# Patient Record
Sex: MALE | Race: WHITE | NOT HISPANIC OR LATINO | Employment: STUDENT | URBAN - NONMETROPOLITAN AREA
[De-identification: names, ages, dates, MRNs, and addresses within clinical notes are randomized per-mention and may not be internally consistent; named-entity substitution may affect disease eponyms.]

---
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
## 2021-05-24 ENCOUNTER — OFFICE VISIT (OUTPATIENT)
Dept: OCCUPATIONAL MEDICINE | Age: 21
End: 2021-05-24

## 2021-05-24 DIAGNOSIS — Z00.8 HEALTH EXAMINATION IN POPULATION SURVEY: Primary | ICD-10-CM

## 2021-05-24 PROCEDURE — OH123 RAPID TEST DRUG KIT & COLLECTION 5 PANEL: Performed by: FAMILY MEDICINE

## 2021-10-15 ENCOUNTER — HOSPITAL ENCOUNTER (EMERGENCY)
Facility: CLINIC | Age: 21
Discharge: HOME | End: 2021-10-15
Attending: EMERGENCY MEDICINE | Admitting: EMERGENCY MEDICINE
Payer: COMMERCIAL

## 2021-10-15 VITALS
HEART RATE: 74 BPM | BODY MASS INDEX: 25.34 KG/M2 | HEIGHT: 70 IN | SYSTOLIC BLOOD PRESSURE: 98 MMHG | DIASTOLIC BLOOD PRESSURE: 50 MMHG | WEIGHT: 177 LBS | RESPIRATION RATE: 16 BRPM | OXYGEN SATURATION: 96 %

## 2021-10-15 DIAGNOSIS — F12.10 MARIJUANA ABUSE: ICD-10-CM

## 2021-10-15 PROCEDURE — 250N000011 HC RX IP 250 OP 636: Performed by: EMERGENCY MEDICINE

## 2021-10-15 PROCEDURE — 99283 EMERGENCY DEPT VISIT LOW MDM: CPT | Performed by: EMERGENCY MEDICINE

## 2021-10-15 PROCEDURE — 250N000013 HC RX MED GY IP 250 OP 250 PS 637: Performed by: EMERGENCY MEDICINE

## 2021-10-15 PROCEDURE — 99284 EMERGENCY DEPT VISIT MOD MDM: CPT | Performed by: EMERGENCY MEDICINE

## 2021-10-15 RX ORDER — ONDANSETRON 4 MG/1
4 TABLET, ORALLY DISINTEGRATING ORAL ONCE
Status: COMPLETED | OUTPATIENT
Start: 2021-10-15 | End: 2021-10-15

## 2021-10-15 RX ORDER — ESCITALOPRAM OXALATE 20 MG/1
20 TABLET ORAL DAILY
COMMUNITY
End: 2023-01-30

## 2021-10-15 RX ORDER — OLANZAPINE 5 MG/1
5 TABLET, ORALLY DISINTEGRATING ORAL ONCE
Status: COMPLETED | OUTPATIENT
Start: 2021-10-15 | End: 2021-10-15

## 2021-10-15 RX ADMIN — OLANZAPINE 5 MG: 5 TABLET, ORALLY DISINTEGRATING ORAL at 02:42

## 2021-10-15 RX ADMIN — ONDANSETRON 4 MG: 4 TABLET, ORALLY DISINTEGRATING ORAL at 02:42

## 2021-10-15 ASSESSMENT — MIFFLIN-ST. JEOR: SCORE: 1819.12

## 2021-10-15 NOTE — ED PROVIDER NOTES
"ED Provider Note  [facility]      History   CC: Marijuana use    HPI  Barertt Bazan is a 21 year old male who has a past medical history of anxiety presenting with vomiting and anxiety after smoking marijuana.  He typically does not smoke marijuana.  He also had a few drinks prior to this.  Denies pain, chest pain, shortness of breath or abdominal pain.  No black or blood in his vomit.  He did not have much to drink he states.  Denies any other symptoms, fevers or other issues.  Patient said he would like something to stop his high.  Denies thoughts of harming self or others, no hallucinations or voices.    Past Medical History  Past Medical History:   Diagnosis Date     History of appendectomy      History reviewed. No pertinent surgical history.  escitalopram (LEXAPRO) 20 MG tablet      Allergies   Allergen Reactions     Amoxicillin-Pot Clavulanate GI Disturbance     Stomach cramping     Oxycodone Anaphylaxis and Other (See Comments)     Difficulty breathing  Respiratory depression  \"restricts breathing\"  \"restricts breathing\"       Doxycycline GI Disturbance     Stomach cramping     Family History  History reviewed. No pertinent family history.  Social History   Social History     Tobacco Use     Smoking status: Never Smoker     Smokeless tobacco: Never Used   Substance Use Topics     Alcohol use: Yes     Drug use: Yes     Types: Marijuana      Past medical history, past surgical history, medications, allergies, family history, and social history were reviewed with the patient. No additional pertinent items.       Review of Systems  A complete review of systems was performed with pertinent positives and negatives noted in the HPI, and all other systems negative.    Physical Exam   BP: 130/79  Pulse: 98  Resp: 16  Height: 177.8 cm (5' 10\")  Weight: 80.3 kg (177 lb)  SpO2: 98 %  Physical Exam  Physical Exam   Constitutional: oriented to person, place, and time. appears well-developed " and well-nourished.   HENT:   Head: Normocephalic and atraumatic.   Neck: Normal range of motion.   Pulmonary/Chest: Effort normal. No respiratory distress.   Cardiac: No murmurs, rubs, gallops. RRR.  Abdominal: Abdomen soft, nontender, nondistended. No rebound tenderness.  MSK: Long bones without deformity or evidence of trauma  Neurological: alert and oriented to person, place, and time.  Gait intact.  Moving all extremities.  Pupils 4 mm and reactive bilaterally to light.  No nystagmus.  No clonus.  No tremor.  Patellar reflexes 2+ and lower extremities.  Sensation intact in all extremities.   Skin: Skin is warm and dry.   Psychiatric:  normal mood and affect.  behavior is normal. Thought content normal.     ED Course      Procedures       No results found for any visits on 10/15/21.  Medications - No data to display     Assessments & Plan (with Medical Decision Making)   MDM  Patient is presenting with intoxication with marijuana.  He is given Zyprexa in addition to Zofran to help with symptoms.  Vitals are stable and the patient otherwise appears well and nontoxic.  No other signs or symptoms of toxidrome.  No acute psychosis at this point.    Re eval: Symptoms improved. Patient sleeping at this point.    Reeval: The patient is ambulating and tolerating p.o. intake.  Patient otherwise appears well.  He will be discharged, discussed marijuana cessation.    I have reviewed the nursing notes. I have reviewed the findings, diagnosis, plan and need for follow up with the patient.    New Prescriptions    No medications on file       Final diagnoses:   Marijuana abuse       --  Jacob Negro  [facility] EMERGENCY DEPARTMENT  10/15/2021     Jacob Negro MD  10/15/21 0623

## 2021-10-15 NOTE — DISCHARGE INSTRUCTIONS
Please make an appointment to follow up with Primary Care - [facility] (phone: [phone]) in 2-3 days if you are not improving    Return to the Emergency Department if you have any further concerns

## 2021-10-15 NOTE — ED TRIAGE NOTES
"Pt arrives to ED with c/o smoking too much marijuana. Pt is anxious, \"twitchy,\" and having bouts of emesis.   "

## 2022-02-15 ENCOUNTER — TRANSCRIBE ORDERS (OUTPATIENT)
Dept: OTHER | Age: 22
End: 2022-02-15
Payer: COMMERCIAL

## 2022-02-15 DIAGNOSIS — S43.431A LABRAL TEAR OF SHOULDER, RIGHT, INITIAL ENCOUNTER: Primary | ICD-10-CM

## 2022-02-15 DIAGNOSIS — M25.511 RIGHT SHOULDER PAIN, UNSPECIFIED CHRONICITY: ICD-10-CM

## 2022-02-24 ENCOUNTER — APPOINTMENT (OUTPATIENT)
Dept: GENERAL RADIOLOGY | Facility: CLINIC | Age: 22
End: 2022-02-24
Attending: EMERGENCY MEDICINE
Payer: COMMERCIAL

## 2022-02-24 ENCOUNTER — HOSPITAL ENCOUNTER (EMERGENCY)
Facility: CLINIC | Age: 22
Discharge: HOME | End: 2022-02-24
Attending: EMERGENCY MEDICINE | Admitting: EMERGENCY MEDICINE
Payer: COMMERCIAL

## 2022-02-24 VITALS
TEMPERATURE: 96.9 F | HEIGHT: 70 IN | BODY MASS INDEX: 25.05 KG/M2 | HEART RATE: 60 BPM | DIASTOLIC BLOOD PRESSURE: 59 MMHG | RESPIRATION RATE: 16 BRPM | WEIGHT: 175 LBS | OXYGEN SATURATION: 96 % | SYSTOLIC BLOOD PRESSURE: 133 MMHG

## 2022-02-24 DIAGNOSIS — R00.2 PALPITATIONS: Primary | ICD-10-CM

## 2022-02-24 LAB
ANION GAP SERPL CALCULATED.3IONS-SCNC: 9 MMOL/L (ref 3–14)
ATRIAL RATE - MUSE: 74 BPM
BASOPHILS # BLD AUTO: 0 10E3/UL (ref 0–0.2)
BASOPHILS NFR BLD AUTO: 1 %
BUN SERPL-MCNC: 21 MG/DL (ref 7–30)
CALCIUM SERPL-MCNC: 9.1 MG/DL (ref 8.5–10.1)
CHLORIDE BLD-SCNC: 107 MMOL/L (ref 94–109)
CO2 SERPL-SCNC: 26 MMOL/L (ref 20–32)
CREAT SERPL-MCNC: 0.99 MG/DL (ref 0.66–1.25)
DIASTOLIC BLOOD PRESSURE - MUSE: NORMAL MMHG
EOSINOPHIL # BLD AUTO: 0.1 10E3/UL (ref 0–0.7)
EOSINOPHIL NFR BLD AUTO: 2 %
ERYTHROCYTE [DISTWIDTH] IN BLOOD BY AUTOMATED COUNT: 13.6 % (ref 10–15)
GFR SERPL CREATININE-BSD FRML MDRD: >90 ML/MIN/1.73M2
GLUCOSE BLD-MCNC: 98 MG/DL (ref 70–99)
HCT VFR BLD AUTO: 46.6 % (ref 40–53)
HGB BLD-MCNC: 15.6 G/DL (ref 13.3–17.7)
HOLD SPECIMEN: NORMAL
IMM GRANULOCYTES # BLD: 0 10E3/UL
IMM GRANULOCYTES NFR BLD: 0 %
INTERPRETATION ECG - MUSE: NORMAL
LYMPHOCYTES # BLD AUTO: 3.2 10E3/UL (ref 0.8–5.3)
LYMPHOCYTES NFR BLD AUTO: 45 %
MCH RBC QN AUTO: 28.7 PG (ref 26.5–33)
MCHC RBC AUTO-ENTMCNC: 33.5 G/DL (ref 31.5–36.5)
MCV RBC AUTO: 86 FL (ref 78–100)
MONOCYTES # BLD AUTO: 0.6 10E3/UL (ref 0–1.3)
MONOCYTES NFR BLD AUTO: 9 %
NEUTROPHILS # BLD AUTO: 3.1 10E3/UL (ref 1.6–8.3)
NEUTROPHILS NFR BLD AUTO: 43 %
NRBC # BLD AUTO: 0 10E3/UL
NRBC BLD AUTO-RTO: 0 /100
P AXIS - MUSE: 63 DEGREES
PLATELET # BLD AUTO: 259 10E3/UL (ref 150–450)
POTASSIUM BLD-SCNC: 3.9 MMOL/L (ref 3.4–5.3)
PR INTERVAL - MUSE: 132 MS
QRS DURATION - MUSE: 100 MS
QT - MUSE: 406 MS
QTC - MUSE: 450 MS
R AXIS - MUSE: 74 DEGREES
RBC # BLD AUTO: 5.43 10E6/UL (ref 4.4–5.9)
SODIUM SERPL-SCNC: 142 MMOL/L (ref 133–144)
SYSTOLIC BLOOD PRESSURE - MUSE: NORMAL MMHG
T AXIS - MUSE: 48 DEGREES
TROPONIN I SERPL HS-MCNC: 5 NG/L
VENTRICULAR RATE- MUSE: 74 BPM
WBC # BLD AUTO: 7 10E3/UL (ref 4–11)

## 2022-02-24 PROCEDURE — 36415 COLL VENOUS BLD VENIPUNCTURE: CPT | Performed by: EMERGENCY MEDICINE

## 2022-02-24 PROCEDURE — 85025 COMPLETE CBC W/AUTO DIFF WBC: CPT | Performed by: EMERGENCY MEDICINE

## 2022-02-24 PROCEDURE — 84484 ASSAY OF TROPONIN QUANT: CPT | Performed by: EMERGENCY MEDICINE

## 2022-02-24 PROCEDURE — 99284 EMERGENCY DEPT VISIT MOD MDM: CPT | Mod: 25 | Performed by: EMERGENCY MEDICINE

## 2022-02-24 PROCEDURE — 71046 X-RAY EXAM CHEST 2 VIEWS: CPT | Mod: 26 | Performed by: RADIOLOGY

## 2022-02-24 PROCEDURE — 99284 EMERGENCY DEPT VISIT MOD MDM: CPT | Performed by: EMERGENCY MEDICINE

## 2022-02-24 PROCEDURE — 93005 ELECTROCARDIOGRAM TRACING: CPT | Performed by: EMERGENCY MEDICINE

## 2022-02-24 PROCEDURE — 71046 X-RAY EXAM CHEST 2 VIEWS: CPT

## 2022-02-24 PROCEDURE — 80048 BASIC METABOLIC PNL TOTAL CA: CPT | Performed by: EMERGENCY MEDICINE

## 2022-02-24 ASSESSMENT — ENCOUNTER SYMPTOMS
DIZZINESS: 0
BACK PAIN: 0
CHILLS: 0
NERVOUS/ANXIOUS: 1
DIARRHEA: 0
CHEST TIGHTNESS: 1
NAUSEA: 0
FEVER: 0
ABDOMINAL PAIN: 0
LIGHT-HEADEDNESS: 1
VOMITING: 0
DIFFICULTY URINATING: 0
SHORTNESS OF BREATH: 1
CONSTIPATION: 0

## 2022-02-24 NOTE — ED PROVIDER NOTES
"ED Provider Note  [facility]      History     Chief Complaint   Patient presents with     Palpitations     Anxiety     HPI  Barrett Bazan is a 21 year old male with a PMH of marijuana abuse, chlamydia, right labral shoulder tear and depression who presents to the ED today complaining of anxiety and palpitations.***    {Past History:770251}      Review of Systems  {Complete vs limited ROS:944947::\"A complete review of systems was performed with pertinent positives and negatives noted in the HPI, and all other systems negative.\"}    Physical Exam   BP: (!) 159/84  Pulse: 105  Temp: 96.9  F (36.1  C)  Resp: 20  Height: 177.8 cm (5' 10\")  Weight: 79.4 kg (175 lb)  SpO2: 99 %  Physical Exam  ***  ED Course      Procedures       {ED Course Selections:784750}              Results for orders placed or performed during the hospital encounter of 02/24/22   EKG 12-lead, tracing only     Status: None (Preliminary result)   Result Value Ref Range    Systolic Blood Pressure  mmHg    Diastolic Blood Pressure  mmHg    Ventricular Rate 74 BPM    Atrial Rate 74 BPM    VT Interval 132 ms    QRS Duration 100 ms     ms    QTc 450 ms    P Axis 63 degrees    R AXIS 74 degrees    T Axis 48 degrees    Interpretation ECG Sinus rhythm  Normal ECG        Medications - No data to display     Assessments & Plan (with Medical Decision Making)   ***    I have reviewed the nursing notes. I have reviewed the findings, diagnosis, plan and need for follow up with the patient.    New Prescriptions    No medications on file       Final diagnoses:   None       --  ***  [facility] EMERGENCY DEPARTMENT  2/24/2022  "

## 2022-02-24 NOTE — ED PROVIDER NOTES
ED Provider Note  [facility]      History     Chief Complaint   Patient presents with     Palpitations     Anxiety     HPI  Barrett Bazan is a 21 year old male with of who comes in with palpitations. About three hours ago (10:30pm), he was sitting reading and felt his heard beating hard in his chest. Over the next hour his heart felt as if it was fluctuating between beating hard, slowly, and then the palpations would briefly resolve. The episodes were associated with chest pain and tightness, shortness of breath, lightheadedness, and diaphoresis. Nothing made these symptoms better. Anxiety made it worse. No chest pain. No vertigo. No fever, chills, sore throat, changes in bowel, changes in bladder, or any other infectious symptoms. No numbness or tingling or focal neurological changes.     He does have increased stressed with an exam tomorrow. Due to this stress, he has been sleeping less (4hrs) and drinking more coffee. He takes Adderall daily. Drinks alcohol ~3 times per week and vapes nicotine.     Family history of high cholesterol and heart disease in mother. No family history of sudden death or heart attack at a young age.      Review of Systems   Constitutional: Negative for chills and fever.   HENT: Negative for sneezing.    Respiratory: Positive for chest tightness and shortness of breath.    Cardiovascular: Positive for chest pain.   Gastrointestinal: Negative for abdominal pain, constipation, diarrhea, nausea and vomiting.   Genitourinary: Negative for difficulty urinating.   Musculoskeletal: Negative for back pain.   Neurological: Positive for light-headedness. Negative for dizziness.   Psychiatric/Behavioral: The patient is nervous/anxious.      A complete review of systems was performed with pertinent positives and negatives noted in the HPI, and all other systems negative.    Physical Exam   BP: (!) 159/84  Pulse: 105  Temp: 96.9  F (36.1  C)  Resp: 20  Height: 177.8 cm  "(5' 10\")  Weight: 79.4 kg (175 lb)  SpO2: 99 %  Physical Exam  Vitals and nursing note reviewed.   Constitutional:       Appearance: Normal appearance.   HENT:      Head: Normocephalic and atraumatic.      Nose: Nose normal.      Mouth/Throat:      Mouth: Mucous membranes are moist.   Eyes:      Extraocular Movements: Extraocular movements intact.      Conjunctiva/sclera: Conjunctivae normal.      Pupils: Pupils are equal, round, and reactive to light.   Cardiovascular:      Rate and Rhythm: Normal rate and regular rhythm.   Pulmonary:      Effort: Pulmonary effort is normal.      Breath sounds: Normal breath sounds.   Skin:     General: Skin is warm.   Neurological:      Mental Status: He is alert and oriented to person, place, and time.       ED Course       1:00 AM Medical student saw and assessed the patient.   3:15 AM Dr. Donnelly saw and assessed the patient. Updated him on the results. The patient is agreeable with the plan for discharge.     Procedures            EKG Interpretation:      Interpreted by Soham Donnelly  Time reviewed: 0045  Symptoms at time of EKG: palpitations  Rhythm: normal sinus   Rate: 74  Axis: normal  Ectopy: none  Conduction: normal  ST Segments/ T Waves: No ST-T wave changes  Q Waves: none  Comparison to prior: No old EKG available    Clinical Impression: normal EKG              Results for orders placed or performed during the hospital encounter of 02/24/22   XR Chest 2 Views     Status: None    Narrative    EXAM: XR CHEST 2 VW  LOCATION: [facility]  DATE/TIME: 2/24/2022 2:27 AM    INDICATION: chest pain  COMPARISON: None.      Impression    IMPRESSION: Heart size is normal. Lungs are clear. No pneumothorax or pleural effusion.   Basic metabolic panel     Status: Normal   Result Value Ref Range    Sodium 142 133 - 144 mmol/L    Potassium 3.9 3.4 - 5.3 mmol/L    Chloride 107 94 - 109 mmol/L    Carbon Dioxide (CO2) 26 20 - 32 mmol/L    " Anion Gap 9 3 - 14 mmol/L    Urea Nitrogen 21 7 - 30 mg/dL    Creatinine 0.99 0.66 - 1.25 mg/dL    Calcium 9.1 8.5 - 10.1 mg/dL    Glucose 98 70 - 99 mg/dL    GFR Estimate >90 >60 mL/min/1.73m2   Troponin I     Status: Normal   Result Value Ref Range    Troponin I High Sensitivity 5 <79 ng/L   CBC with platelets and differential     Status: None   Result Value Ref Range    WBC Count 7.0 4.0 - 11.0 10e3/uL    RBC Count 5.43 4.40 - 5.90 10e6/uL    Hemoglobin 15.6 13.3 - 17.7 g/dL    Hematocrit 46.6 40.0 - 53.0 %    MCV 86 78 - 100 fL    MCH 28.7 26.5 - 33.0 pg    MCHC 33.5 31.5 - 36.5 g/dL    RDW 13.6 10.0 - 15.0 %    Platelet Count 259 150 - 450 10e3/uL    % Neutrophils 43 %    % Lymphocytes 45 %    % Monocytes 9 %    % Eosinophils 2 %    % Basophils 1 %    % Immature Granulocytes 0 %    NRBCs per 100 WBC 0 <1 /100    Absolute Neutrophils 3.1 1.6 - 8.3 10e3/uL    Absolute Lymphocytes 3.2 0.8 - 5.3 10e3/uL    Absolute Monocytes 0.6 0.0 - 1.3 10e3/uL    Absolute Eosinophils 0.1 0.0 - 0.7 10e3/uL    Absolute Basophils 0.0 0.0 - 0.2 10e3/uL    Absolute Immature Granulocytes 0.0 <=0.4 10e3/uL    Absolute NRBCs 0.0 10e3/uL   EKG 12-lead, tracing only     Status: None (Preliminary result)   Result Value Ref Range    Systolic Blood Pressure  mmHg    Diastolic Blood Pressure  mmHg    Ventricular Rate 74 BPM    Atrial Rate 74 BPM    ME Interval 132 ms    QRS Duration 100 ms     ms    QTc 450 ms    P Axis 63 degrees    R AXIS 74 degrees    T Axis 48 degrees    Interpretation ECG Sinus rhythm  Normal ECG      CBC with platelets differential     Status: None    Narrative    The following orders were created for panel order CBC with platelets differential.  Procedure                               Abnormality         Status                     ---------                               -----------         ------                     CBC with platelets and d...[[id number]]                      Final result                 Please  view results for these tests on the individual orders.     Medications - No data to display     Assessments & Plan (with Medical Decision Making)   Barrett Bazan is a 21 year old male with of who comes in with palpitations. Differential diagnosis includes: arrhythmias, valvular disease, acute coronary syndrome, psychiatric causes (I.e., panic attack, anxiety, somatic symptom disorder, drugs/medications (alcohol, caffeine, tobacco, cocaine, etc.), hyperthyroidism, and anemia.      Exam did not reveal any arrhythmia, murmurs, rubs, gallops. EKG was unremarkable. BMP returned in normal limits. Troponin was within normal limits. History revealed high levels of caffeine intake (3 cups of coffee & tobacco use), decreased sleep, and increased stress. Given his unremarkable work-up and risk factors of palpations, his palpations were likely provoked by a mixture of psychiatric and caffeine/stimulant use. However, discharging with plan for cardiac Holter monitor to further assess for arrhythmias.     I have reviewed the nursing notes. I have reviewed the findings, diagnosis, plan and need for follow up with the patient.      --    ED Attending Physician Attestation    I Soham Donnelly DO, cared for this patient with the Medical Student. I performed, or re-performed, the physical exam and medical decision-making. I have verified the accuracy of all the medical student findings and documentation above, and have edited as necessary.    Summary of HPI, PE, ED Course   Patient is a 21 year old male evaluated in the emergency department for palpitations. Exam notable for well appearing. ED course notable for negative cardiac w/u. After the completion of care in the emergency department, the patient was discharged.    Critical Care & Procedures  Not applicable.    Medical Decision Making  The medical record was reviewed and interpreted.  Current labs reviewed and interpreted.   Patient resents for evaluation of palpitations.  Has  multiple risk factors for palpitations including increased school stress, caffeine, tobacco, Adderall.  On arrival, patient is normal vital signs.  Overall appears well.  PERC negative, unlikely PE.  EKG without evidence of significant cardiac dysrhythmia.  No signs of acute ischemia.  Troponin negative x1.  No chest pain, no further ACS work-up indicated.  No electrolyte abnormality.  No anemia.    Given the negative work-up, pt discharged in good condition.  Patient will be referred for Zio Patch Holter monitor and recommend PCP follow-up in 1 week.      Soham Donnelly DO  Emergency Medicine       Discharge Medication List as of 2/24/2022  3:26 AM          Final diagnoses:   Palpitations     Tiera Martin, MS4  --  Soham Donnelly DO  [facility] EMERGENCY DEPARTMENT  2/24/2022     Soham Donnelly DO  02/24/22 0557

## 2022-02-24 NOTE — Clinical Note
Beni was seen and treated in our emergency department on 2/24/2022.  He may return to school on 02/28/2022.      If you have any questions or concerns, please don't hesitate to call.      Sweta Mcgregor RN

## 2022-02-24 NOTE — Clinical Note
Beni was seen and treated in our emergency department on 2/24/2022.  He may return to school on 02/25/2022.      If you have any questions or concerns, please don't hesitate to call.      Sweta Mcgregor RN

## 2022-02-24 NOTE — ED TRIAGE NOTES
Pt. Reports to ED w/ c/o anxiety and palpitations that are waking him frequently throughout the night and causing him to be unable to sleep.

## 2022-02-24 NOTE — DISCHARGE INSTRUCTIONS
Your work-up in the emergency department including chest x-ray, labs, EKG did not reveal any significant medical abnormalities.  Would like to have you follow-up for a Zio patch (Holter monitor) application.  Please call the number below to set up this appointment.  You will need to follow-up with your primary care physician to discuss the results.  Return to the ED with any new or worsening symptoms.    [phone]

## 2022-03-01 ENCOUNTER — HOSPITAL ENCOUNTER (OUTPATIENT)
Dept: CARDIOLOGY | Facility: CLINIC | Age: 22
Discharge: HOME | End: 2022-03-01
Attending: EMERGENCY MEDICINE | Admitting: EMERGENCY MEDICINE
Payer: COMMERCIAL

## 2022-03-01 DIAGNOSIS — R00.2 PALPITATIONS: ICD-10-CM

## 2022-03-01 PROCEDURE — 93244 EXT ECG>48HR<7D REV&INTERPJ: CPT | Performed by: INTERNAL MEDICINE

## 2022-03-01 PROCEDURE — 93242 EXT ECG>48HR<7D RECORDING: CPT

## 2022-03-25 ENCOUNTER — THERAPY VISIT (OUTPATIENT)
Dept: PHYSICAL THERAPY | Facility: CLINIC | Age: 22
End: 2022-03-25
Payer: COMMERCIAL

## 2022-03-25 DIAGNOSIS — M25.511 RIGHT SHOULDER PAIN: Primary | ICD-10-CM

## 2022-03-25 DIAGNOSIS — S43.439A LABRAL TEAR OF SHOULDER: ICD-10-CM

## 2022-03-25 PROCEDURE — 97110 THERAPEUTIC EXERCISES: CPT | Mod: GP

## 2022-03-25 PROCEDURE — 97161 PT EVAL LOW COMPLEX 20 MIN: CPT | Mod: GP

## 2022-03-25 NOTE — PROGRESS NOTES
"Physical Therapy Initial Examination/Evaluation  March 25, 2022    Barrett Bazan is a 21 year old male referred to physical therapy by Dr. Mendoza for treatment of R shoulder pain and labral tear with Precautions/Restrictions/MD instructions Eval and treat.     Therapist Impression:   Patient has complaints of R shoulder pain with certain movements very infrequently, has been able to do almost all UE lifts (been avoiding chest and overhead press). Patient was snowboarding at [place], sustained a fall and found to have labral tear (patient reports 80% torn). Patient has surgery planned May 18, 2022 back home in [place]. Patient found to have poor posture, painful / weaker shoulder MMT, . Patient given HEP with scapular retraction, seated posture correction with lumbar roll, ER and IR with red TB.     Subjective:  DOI/onset: 12/28/2022 DOS: Planned May 18, 2022  Acute Injury or Gradual Onset?: Acute injury onset  Mechanism of Injury: Fall Snowboarding, fell and shoulder went underneath him, felt a pop and thought his arm was dislocated  Related PMH: None  Imaging: MRI  Chief Complaint/Functional Limitations: R shoulder pain and see below in therapy evaluation codes   Pain: rest 2/10, activity 9/10 Location: anterior Frequency: Intermittent Described as: shooting, dull, achy Previous Treatment: Rest, Aleve and Ibuprofen Effect of prior treatment: good Alleviated by: Rest Progression of Symptoms: Gradually getting better. Time of day when pain is worse: Activity related  Sleeping: No issues/uninterrupted   Occupation: student  Job duties: prolonged sitting, keyboarding/computer use, lifting/carrying  Current HEP/exercise regimen: Lifting UE, running, stretch   Patient's goals are see chief complaints \"To work out kinks in my shoulder\"     Other pertinent PMH/Red Flags: Depression   Barriers at home/work: None as reported by patient  Pertinent Surgical History:   Medications: Anti-depressants and Adderral "   General health as reported by patient: good  Return to MD:  PRN     SHOULDER EXAMINATION  Diagnosis: R labral tear     R handed     CERVICAL SCREEN  AROM: WNL/symmetrical All motions    STATIC POSTURE  Forward head: mild   Rounded shoulders:moderate  Shoulder internally rotated: mild     DYNAMIC SCAPULAR TESTS  Dynamic Scapular Assessment: Poor eccentric control R    SHOULDER RANGE OF MOTION * = painful  AROM Flexion Abduction ER   Base Ext/IR Horr ADD   Left WNL WNL WNL WNL WNL   Right WNL WNL WNL WNL WNL*    Pain: None    SHOULDER STRENGTH * = painful  MMT Flexion Abduction ER IR   Left 5-/5 5/5 5-/5 5-/5   Right 5-/5 5/5 4+/5* 5-/5*     SPECIAL TESTS Left Right   Impingement Negative Negative  Apprehension Negative Negative    PALPATION  Left: No tenderness to palpation  Right: No tenderness to palpation    Assessment/Plan:  Patient is a 21 year old male with right side shoulder complaints.    Patient has the following significant findings with corresponding treatment plan.                Diagnosis 1:  R shoulder pain  Decreased strength - therapeutic exercise and therapeutic activities  Impaired muscle performance - neuro re-education  Decreased function - therapeutic activities  Impaired posture - neuro re-education    Therapy Evaluation Codes:   1) History comprised of:   Personal factors that impact the plan of care:      None.    Comorbidity factors that impact the plan of care are:      Depression.     Medications impacting care: Anti-depressant and Adderall.  2) Examination of Body Systems comprised of:   Body structures and functions that impact the plan of care:      Shoulder.   Activity limitations that impact the plan of care are:      Pushing, Reaching and Lying.  3) Clinical presentation characteristics are:   Stable/Uncomplicated.  4) Decision-Making    Low complexity using standardized patient assessment instrument and/or measureable assessment of functional outcome.  Cumulative Therapy  Evaluation is: Low complexity.    Previous and current functional limitations:  (See Goal Flow Sheet for this information)    Short term and Long term goals: (See Goal Flow Sheet for this information)     Communication ability:  Patient appears to be able to clearly communicate and understand verbal and written communication and follow directions correctly.  Treatment Explanation - The following has been discussed with the patient:   RX ordered/plan of care  Anticipated outcomes  Possible risks and side effects  This patient would benefit from PT intervention to resume normal activities.   Rehab potential is good.    Frequency:  1 X week, once daily  Duration:  for 2 weeks, tapering to 2x/month x 2 months  Discharge Plan:  Achieve all LTG.  Independent in home treatment program.  Reach maximal therapeutic benefit.    Please refer to the daily flowsheet for treatment today, total treatment time and time spent performing 1:1 timed codes.

## 2022-04-08 ENCOUNTER — OFFICE VISIT (OUTPATIENT)
Dept: CARDIOLOGY | Age: 22
End: 2022-04-08

## 2022-04-08 ENCOUNTER — TELEPHONE (OUTPATIENT)
Dept: CARDIOLOGY | Age: 22
End: 2022-04-08

## 2022-04-08 DIAGNOSIS — R00.2 INTERMITTENT PALPITATIONS: ICD-10-CM

## 2022-04-08 DIAGNOSIS — R00.2 INTERMITTENT PALPITATIONS: Primary | ICD-10-CM

## 2022-04-16 ENCOUNTER — NURSE TRIAGE (OUTPATIENT)
Dept: TELEHEALTH | Age: 22
End: 2022-04-16

## 2022-04-21 PROCEDURE — 93272 ECG/REVIEW INTERPRET ONLY: CPT | Performed by: INTERNAL MEDICINE

## 2022-04-29 DIAGNOSIS — Z01.812 ENCOUNTER FOR PREPROCEDURE SCREENING LABORATORY TESTING FOR COVID-19: Primary | ICD-10-CM

## 2022-04-29 DIAGNOSIS — Z11.52 ENCOUNTER FOR PREPROCEDURE SCREENING LABORATORY TESTING FOR COVID-19: Primary | ICD-10-CM

## 2022-04-29 DIAGNOSIS — I20.89 ANGINAL EQUIVALENT: ICD-10-CM

## 2022-04-29 DIAGNOSIS — I47.29 NSVT (NONSUSTAINED VENTRICULAR TACHYCARDIA) (CMD): ICD-10-CM

## 2022-05-12 ENCOUNTER — IMAGING SERVICES (OUTPATIENT)
Dept: CV DIAGNOSTICS | Age: 22
End: 2022-05-12
Attending: INTERNAL MEDICINE

## 2022-05-12 VITALS — SYSTOLIC BLOOD PRESSURE: 100 MMHG | HEART RATE: 43 BPM | DIASTOLIC BLOOD PRESSURE: 60 MMHG

## 2022-05-12 DIAGNOSIS — I20.89 ANGINAL EQUIVALENT: ICD-10-CM

## 2022-05-12 DIAGNOSIS — I47.29 NSVT (NONSUSTAINED VENTRICULAR TACHYCARDIA) (CMD): ICD-10-CM

## 2022-05-12 LAB
INTERVENTRICULAR SEPTUM IN END DIASTOLE (IVSD): 0.9 CM
LEFT INTERNAL DIMENSION IN SYSTOLE (LVSD): 3.58 CM
LEFT VENTRICULAR INTERNAL DIMENSION IN DIASTOLE (LVDD): 5.44 CM
LEFT VENTRICULAR POSTERIOR WALL IN END DIASTOLE (LVPW): 0.82 CM
LV EF: 65 %

## 2022-05-12 PROCEDURE — 93351 STRESS TTE COMPLETE: CPT | Performed by: INTERNAL MEDICINE

## 2022-05-15 PROCEDURE — U0005 INFEC AGEN DETEC AMPLI PROBE: HCPCS | Performed by: CLINICAL MEDICAL LABORATORY

## 2022-05-15 PROCEDURE — PSEU10635 2019 NOVEL CORONAVIRUS (SARS-COV-2): Performed by: CLINICAL MEDICAL LABORATORY

## 2022-05-15 PROCEDURE — U0003 INFECTIOUS AGENT DETECTION BY NUCLEIC ACID (DNA OR RNA); SEVERE ACUTE RESPIRATORY SYNDROME CORONAVIRUS 2 (SARS-COV-2) (CORONAVIRUS DISEASE [COVID-19]), AMPLIFIED PROBE TECHNIQUE, MAKING USE OF HIGH THROUGHPUT TECHNOLOGIES AS DESCRIBED BY CMS-2020-01-R: HCPCS | Performed by: CLINICAL MEDICAL LABORATORY

## 2022-05-16 ENCOUNTER — LAB REQUISITION (OUTPATIENT)
Dept: LAB | Age: 22
End: 2022-05-16

## 2022-05-16 DIAGNOSIS — Z11.59 ENCOUNTER FOR SCREENING FOR OTHER VIRAL DISEASES: ICD-10-CM

## 2022-05-16 LAB
SARS-COV-2 RNA RESP QL NAA+PROBE: NOT DETECTED
SERVICE CMNT-IMP: NORMAL
SERVICE CMNT-IMP: NORMAL

## 2022-06-29 ENCOUNTER — APPOINTMENT (OUTPATIENT)
Dept: CT IMAGING | Age: 22
End: 2022-06-29
Attending: EMERGENCY MEDICINE

## 2022-06-29 ENCOUNTER — HOSPITAL ENCOUNTER (EMERGENCY)
Age: 22
Discharge: HOME | End: 2022-06-29
Attending: EMERGENCY MEDICINE

## 2022-06-29 VITALS
HEART RATE: 83 BPM | TEMPERATURE: 97.8 F | RESPIRATION RATE: 26 BRPM | DIASTOLIC BLOOD PRESSURE: 68 MMHG | WEIGHT: 180 LBS | HEIGHT: 70 IN | OXYGEN SATURATION: 95 % | SYSTOLIC BLOOD PRESSURE: 122 MMHG | BODY MASS INDEX: 25.77 KG/M2

## 2022-06-29 DIAGNOSIS — F41.0 ANXIETY ATTACK: Primary | ICD-10-CM

## 2022-06-29 LAB
ANION GAP BLD CALC-SCNC: 20 MMOL/L (ref 7–19)
BUN BLD-MCNC: 20 MG/DL (ref 6–20)
CA-I BLD-SCNC: 1.2 MMOL/L (ref 1.15–1.29)
CHLORIDE BLD-SCNC: 105 MMOL/L (ref 97–110)
CO2 BLD-SCNC: 23 MMOL/L (ref 19–24)
CREAT SERPL-MCNC: 0.9 MG/DL (ref 0.67–1.17)
GFR SERPLBLD BASED ON 1.73 SQ M-ARVRAT: >90 ML/MIN
GLUCOSE BLD-MCNC: 110 MG/DL (ref 70–99)
HCT VFR BLD CALC: 48 % (ref 39–51)
HGB BLD CALC-MCNC: 16.3 G/DL (ref 13–17)
POTASSIUM BLD-SCNC: 3.7 MMOL/L (ref 3.4–5.1)
SODIUM BLD-SCNC: 144 MMOL/L (ref 135–145)

## 2022-06-29 PROCEDURE — 70450 CT HEAD/BRAIN W/O DYE: CPT

## 2022-06-29 PROCEDURE — 80047 BASIC METABLC PNL IONIZED CA: CPT

## 2022-06-29 PROCEDURE — G1004 CDSM NDSC: HCPCS | Performed by: RADIOLOGY

## 2022-06-29 PROCEDURE — 70450 CT HEAD/BRAIN W/O DYE: CPT | Performed by: RADIOLOGY

## 2022-06-29 PROCEDURE — 10002800 HB RX 250 W HCPCS: Performed by: EMERGENCY MEDICINE

## 2022-06-29 PROCEDURE — 36415 COLL VENOUS BLD VENIPUNCTURE: CPT

## 2022-06-29 PROCEDURE — 96375 TX/PRO/DX INJ NEW DRUG ADDON: CPT

## 2022-06-29 PROCEDURE — 99284 EMERGENCY DEPT VISIT MOD MDM: CPT | Performed by: EMERGENCY MEDICINE

## 2022-06-29 PROCEDURE — 99284 EMERGENCY DEPT VISIT MOD MDM: CPT

## 2022-06-29 PROCEDURE — G1004 CDSM NDSC: HCPCS

## 2022-06-29 PROCEDURE — 96374 THER/PROPH/DIAG INJ IV PUSH: CPT

## 2022-06-29 RX ORDER — LORAZEPAM 2 MG/ML
1 INJECTION INTRAMUSCULAR ONCE
Status: COMPLETED | OUTPATIENT
Start: 2022-06-29 | End: 2022-06-29

## 2022-06-29 RX ORDER — ONDANSETRON 2 MG/ML
4 INJECTION INTRAMUSCULAR; INTRAVENOUS ONCE
Status: COMPLETED | OUTPATIENT
Start: 2022-06-29 | End: 2022-06-29

## 2022-06-29 RX ADMIN — ONDANSETRON 4 MG: 2 INJECTION INTRAMUSCULAR; INTRAVENOUS at 21:16

## 2022-06-29 RX ADMIN — LORAZEPAM 1 MG: 2 INJECTION INTRAMUSCULAR; INTRAVENOUS at 21:16

## 2022-06-29 ASSESSMENT — ENCOUNTER SYMPTOMS
ADENOPATHY: 0
SHORTNESS OF BREATH: 0
CONFUSION: 0
CHILLS: 0
NERVOUS/ANXIOUS: 1
FEVER: 0
NAUSEA: 0
CONSTIPATION: 0
COLOR CHANGE: 0
COUGH: 0
ABDOMINAL PAIN: 0
SORE THROAT: 0
BRUISES/BLEEDS EASILY: 0
NUMBNESS: 0
WEAKNESS: 0
DIZZINESS: 0
DIARRHEA: 0
VOMITING: 0
EYE REDNESS: 0
BACK PAIN: 0
HEADACHES: 0
EYE PAIN: 0
LIGHT-HEADEDNESS: 0

## 2022-06-29 ASSESSMENT — PAIN SCALES - GENERAL: PAINLEVEL_OUTOF10: 0

## 2022-06-30 LAB
RAINBOW EXTRA TUBES HOLD SPECIMEN: NORMAL
RAINBOW EXTRA TUBES HOLD SPECIMEN: NORMAL

## 2022-09-06 ENCOUNTER — TELEPHONE (OUTPATIENT)
Dept: PSYCHIATRY | Facility: CLINIC | Age: 22
End: 2022-09-06

## 2022-09-06 NOTE — TELEPHONE ENCOUNTER
PSYCHIATRY CLINIC PHONE INTAKE     SERVICES REQUESTED / INTERESTED IN          Individual Psychotherapy  and psychiatrist    Presenting Problem and Brief History                              What would you like to be seen for? (brief description):  Pt was diagnosed with depression about 4 years ago, ADD was diagnosed last November, and anxiety and anxiety disorder was diagnosed on March of 2022. Pt is taking venlafaxine 115mg per day and lorzapam as needed. Pt is looking to address his frequent problem of being very relaxed and then he will be stricken with arm pain, and his anxiety starts. The anxiety increases as the symptoms persist. Pt has had a brain scan and heart test, including multiple EKGs and chest x-ray. Pt's appetite is decreased and has been having bad dreams (since last night, which he believes is from the medication he takes). Pt is a student at the [employer], and his PCP from [place] has been prescribing medication.     Have you received a mental health diagnosis? Yes   Which one (s): Depression, ADD, Anxiety, and anxiety disorder  Is there any history of developmental delay?  Pt is unsure but he doesn't believe so.   Are you currently seeing a mental health provider?  No            Who / month last seen:  NA  Do you have mental health records elsewhere?  No  Will you sign a release so we can obtain them?  No    Have you ever been hospitalized for psychiatric reasons?  Yes  Describe:  Pt was admitted in 2019 for sever depression.     Do you have current thoughts of self-harm?  No    Do you currently have thoughts of harming others?  No       Substance Use History     Do you have any history of alcohol / illicit drug use?  No  Describe:  NA  Have you ever received treatment for this?  No    Describe:  NA     Social History     Who is the patient's a guardian?  Yes    Name / number: Maddy Bazan and Barrett Bazan - Parents - [phone] (mom) and [phone] (dad)  Have you had an ACT team in last 12  months?  No  Describe: NA   OK to leave a detailed voicemail?  Yes    Would you be interested in learning more about research opportunities for which you or your child may qualify? We can connect you with a team member for more information.  No  If yes, send an inbasket message to Ebony Thurston    Medical/ Surgical History                                   Patient Active Problem List   Diagnosis     Labral tear of shoulder     Right shoulder pain          Medications             Current Outpatient Medications   Medication Sig Dispense Refill     escitalopram (LEXAPRO) 20 MG tablet Take 20 mg by mouth daily           DISPOSITION      9/6/22 Intake complete. Scheduled for Supportive therapy w/ Mac Ramirez on 9/13 at 4pm for 60 mins. Scheduled for AMITA w/  on 9/19 at 10am.     Celeste Carreon Sr. - Lead

## 2022-09-13 ENCOUNTER — VIRTUAL VISIT (OUTPATIENT)
Dept: PSYCHIATRY | Facility: CLINIC | Age: 22
End: 2022-09-13
Attending: PSYCHOLOGIST
Payer: COMMERCIAL

## 2022-09-13 DIAGNOSIS — F41.0 PANIC DISORDER WITHOUT AGORAPHOBIA: Primary | ICD-10-CM

## 2022-09-13 PROCEDURE — 90832 PSYTX W PT 30 MINUTES: CPT | Mod: 95

## 2022-09-18 NOTE — PROGRESS NOTES
"     [facility]ry [facility]    Psychotherapy Progress Note      Date: Sep 13, 2022    Patient name: Barrett Bazan     Patient MRN: [id number]    Provider: Smita Abbott MD    Procedure: Individual psychotherapy session  Referred by:   Dr. Naylor (Full Diagnositic Assessment)      Session length: 37 minutes (Video (start: 4:00 PM ,Video  stop: 4:37 PM ).    Video-Visit Details  2  Type of service:  Video Visit    Video Start Time (time video started): 4pm    Video End Time (time video stopped): 4:37pm    Originating Location (pt. Location): Home    Distant Location (provider location):  Off-site    Mode of Communication:  Video Conference via [facility]    Physician has received verbal consent for a Video Visit from the patient? Yes            Session #1 :      Session content:  Writer met with patient over virtual video visit. Patient was introduced the concept of the supportive psychotherapy. The expectations were reviewed. His previous experience with therapy was reviewed and therapist identified the positive and negative aspects of previous encounters. It was mentioned that for each session there would be a progress note documented with the least amount of personal information necessary. Barrett expressed his willingness to stay on his current medication. Common TIPP skills were explained briefly. The patient indicated understanding and was given the opportunity to ask questions.      Behavioral assignment:  N/A     Behavioral observations/mental status:     Patient arrived on time to session. Patient was appropriately groomed and dressed. Eye contact was fair. Mood today was \"ok\". Observed affect was euthymic and appropriate which was consistent throughout the session. Speech was normal in tone, rate and volume. Thought process was linear, logical, goal oriented. Patient was actively engaged in session.    MENTAL STATUS EXAM  Appearance: appropriately dressed " "and groomed  Attitude: cooperative, grateful and pleasant  Behavior: normal  Eye Contact: normal  Speech: normal  Orientation: oreinted to person , place, time and situation  Mood: \"Ok\"  Affect: Euthymic, mood Congruent, reactive, appropriate to context, full and broad  Thought Process: clear, linear, goal-oriented  Suicidal Ideation: no thoughts/intention/plan  Hallucination: no       Risk assessment: Patient reported an absence of suicidal ideation. Based on risk and protective factors, patient is assessed to be at a low level of risk for suicide.     Risk factors: male, single, anxiety and panic attack  Protective factors: No SI, stable house, seek mental health care, no prior SI or suicide attempt, no access to lethal weapons        Diagnoses treated:  Encounter Diagnosis   Name Primary?     Panic disorder without agoraphobia Yes    blows in from plan \"visit diagnosis\"     Plan: Continue weekly individual supportive psychotherapy for the above diagnoses.       Aguilar Mccracken, PGY-2 (Psychiatry)  [facility]    "

## 2022-09-19 ENCOUNTER — VIRTUAL VISIT (OUTPATIENT)
Dept: PSYCHIATRY | Facility: CLINIC | Age: 22
End: 2022-09-19
Attending: PSYCHIATRY & NEUROLOGY
Payer: COMMERCIAL

## 2022-09-19 DIAGNOSIS — F41.1 GAD (GENERALIZED ANXIETY DISORDER): Primary | ICD-10-CM

## 2022-09-19 PROCEDURE — 90792 PSYCH DIAG EVAL W/MED SRVCS: CPT | Mod: 95 | Performed by: PSYCHIATRY & NEUROLOGY

## 2022-09-19 NOTE — PATIENT INSTRUCTIONS
**For crisis resources, please see the information at the end of this document**   Patient Education    Thank you for coming to the [facility] [place] [facility].     Lab Testing:  If you had lab testing today and your results are reassuring or normal they will be mailed to you or sent through [employer] within 7 days. If the lab tests need quick action we will call you with the results. The phone number we will call with results is # [phone]. If this is not the best number please call our clinic and change the number.     Medication Refills:  If you need any refills please call your pharmacy and they will contact us. Our fax number for refills is [phone].   Three business days of notice are needed for general medication refill requests.   Five business days of notice are needed for controlled substance refill requests.   If you need to change to a different pharmacy, please contact the new pharmacy directly. The new pharmacy will help you get your medications transferred.     Contact Us:  Please call [phone] during business hours (8-5:00 M-F).   If you have medication related questions after clinic hours, or on the weekend, please call [phone].     Financial Assistance [phone]   Medical Records [phone]       MENTAL HEALTH CRISIS RESOURCES:  For a emergency help, please call 911 or go to the nearest Emergency Department.     Emergency Walk-In Options:   Em[facility] Unit @ [facility] ([place]): [phone] - Specialized mental health emergency area designed to be calming  [facility] ([place]): [phone]  [facility] ([place]): [phone]  [facility]): [phone]    [facility] Crisis Information:   [place]: [phone]  [place]: [phone]  [place] - Adult: [phone]     Child: [phone]  [place] - Adult: [phone]     Child: [phone]  [place]:  [phone]  List of all [facility] resources:   https://[place].gov/dhs/people-we-serve/adults/health-care/mental-health/resources/crisis-contacts.jsp    National Crisis Information:   Crisis Text Line: Text  [place]  to 115654  Suicide & Crisis Lifeline: 988  National Suicide Prevention Lifeline: 4-915-267-TALK (1-146.106.3039)       For online chat options, visit https://suicidepreventionlifeline.org/chat/  Poison Control Center: 1-159-344-4840  Trans Lifeline: 7-201-203-7742 - Hotline for transgender people of all ages  The Minh Project: 5-545-753-2477 - Hotline for LGBT youth     For Non-Emergency Support:   Fast Tracker: Mental Health & Substance Use Disorder Resources -   https://www.[place].org/

## 2022-09-19 NOTE — PROGRESS NOTES
"Barrett Bazan is a 22 year old who has consented to receive services via billable video visit.      Pt will join video visit via: Venga  If there are problems joining the visit, send backup video invite via: Send to preferred e-mail: [email or web address]      Originating Location (patient location): Patient's home  Distant Location (provider location): [facility] [place] [facility]    Will anyone else be joining the video visit? No     PSYCHIATRY [facility] CONSULT NOTE            Telehealth Details  Type of service:  video visit for medication management  Date/Time of service: 9/19/2022    Start Time:  10:02 AM    End Time:   11.40 AM    Originating Site (patient location):  [facility] [place]   Location- Patient's home  Distant Site (provider location):  Provider office  Mode of Communication:  Video conference via Venga    CHIEF COMPLAINT                                                    \"Problems with panic attacks for the past 9 months.\"    HISTORY                                                    Barrett Bazan is a 22 year old male with a hx of ADHD ad Anxiety who  is self referred to the [facility] for a medication management consult. Pt is seen alone.    Most recent history started about 9 months ago when patient started having problems with panic attacks, in the context of an injury to his right labrum. He states that he would have sporadic episodes of arm pain, palpitations, heart rate when trying to go to bed. He states that this would make him \"shoot up\" from bed and this would make him even more anxious about sleeping. He states that starting form that time, he underwent a slew of tests, - EKG, stress tests, heart monitor, to r/o medical causes. He notes being relieved that \"nothing was positive\", and started seeing a therapist, whom he found unhelpful in the long run. He states that around summer, he explored meds with his PCP. He had been on escitalopram, for the past 4 " "years and cross-tapered to sertraline ( which was also unhelpful) before starting venlafaxine. He states that he had his last dose adjustment towards the end of August, now on 187.5 mg. He states that at the time he made this appointment, he was still struggling with managing his anxiety, but thinks now that the \" medication is finally settling\" and he is feeling a bit better.    Patient notes that currently, his anxiety and panic attacks are triggered by Issues with going to class, abrupt changes and relocating, as well as transitions. He states that he has noticed hat he is very sensitive to changes to his environment - intense artificial light in stores, car rides etc, make him feel light headed and nauseous. He denies worrying excesively but thinks that \"my body tends to react to anxiety even when I am not anxious\". Patient states that his last panic attack was last Monday at his first frat meeting, - his heart rate increased, started feeling clammy and light headed \" like I would pass out\", he notes having to step out and return to his dorm to take lorazepam 0.5 mg which is prescribed to him for his panic attacks and call his mom for reassurance, which is usually helpful. Patient states that he had been on hydroxyzine in the past as well as Clonazepam as a PRN for anxiety. He states that he tries to space out the lorazepam as he is aware of the addictive potential, he has a PCP in [place] who is monitoring this.     Pt notes that although he has always had an anxious temperament,and some social anxiety earlier, he has actually struggled more with depression in the past. He notes a hx of hospitalization for active SI in high school. He states that he had always struggled with attention/focus in school and thought he was \"just bad at school\", got tested last fall and was diagnosed with ADHD. Patient states he is prescribed Vyvnase 20 mg which was helpful, however he stopped it in February as it was potentially " "\"accelerating\" his anxiety attacks. However, this makes it challenging at school, as he is unable to focus appropriately or retain >20% of the information presented. He states that he has been utilizing behavioral interventions to improve his attention.  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    Patient states that his mood is good, no problems with sleep, some decreased appetite with venlafaxine. Patient endorses social use of alcohol as a frat member, denies current nicotine or other substance use. Pt notes that currently, he is interested in therapy to learn some coping skills, met with one last week but is unsure if supportive vs CBT. No safety concerns      Social Updates (home/ school/ substance use):  Family relationships: good     School:   Year: jared at the [employer], study, Global studies  IEP/504/Special Education: None  Suspensions/Expulsions: None  Grades: fair  School functioning: good    RECENT SYMPTOMS:   DEPRESSION:  reports-appetite changes, poor concentration /memory and overwhelmed;  DENIES- suicidal ideation, anhedonia, insomnia and excessive guilt  DYSREGULATION:  reports-none;  DENIES- suicidal ideation, violent ideation, SIB, impulsive and aggressive  PANIC ATTACK:  peaks in < 10 mins, occurs 1-2xx per week, triggers are mostly known, dizziness, flushing, palpitations, diaphoresis, extremity paresthesia, fear of impending doom  and fear of losing control or going crazy   ANXIETY:  panic attacks [see above], feeling fearful, obsessions [negative thoughts regarding to having a seizure or LOC 2/2 panic attacks] and nervous/overwhelmed  COMPULSIVE:  obsessive negative thoughts  ATTENTION:  difficulty paying attention, being easily distracted, sense of restlessness and h/o ADHD [314.00 " Predominantly inattentive ]  SLEEP:  none    EATING DISORDER: none    RECENT SUBSTANCE USE:     ALCOHOL- regular social drinker           TOBACCO- none          CAFFEINE- 1-2 coffees /day        OPIOIDS- none           NARCAN KIT- N/A    CANNABIS- none         OTHER ILLICIT DRUGS- none     CURRENT SOCIAL HISTORY:  Financial Support- family or friend.     Siblings  18 y/o sister.     Living Situation- in the frat dorm at the .      Social/Spiritual Support- family.     Feels Safe at Home- Yes.    MEDICAL ROS:  Reports A comprehensive review of systems was performed and is negative other than noted in the HPI..  Denies sedation, fatigue, headache, diaphoresis, dizziness.    SUBSTANCE USE HISTORY                                                                             Past Use- intermittent social use of alcohol, past use of nicotine  Treatment [#, most recent] - none  Medical Consequences [withdrawal, sz etc] - none  HIV/Hepatitis- none  Legal Consequences- none    PSYCHIATRIC HISTORY     SIB [method, most recent]- none  Suicidal Ideation Hx [passive, active]- hx of active SI 4 years ago with plan to shoot self  Suicide Attempt [#, recent, method]:   #- N/A   Most Recent- N/A    Violence/Aggression Hx- none  Psychosis Hx- none  Psych Hosp [ #, most recent, committed]- yes, one week hospitalization in 2019 in [place]  ECT [#, most recent]- none    Eating Disorder- none    Outpatient Programs [ DBT, Day Treatment, Eating Disorder Tx etc] : None    SOCIAL and FAMILY HISTORY                                          patient reported     Trauma History (self-report)- none  Legal- none  Social/Spiritual Support- none  Early History/Education-  Pt grew up in [place] and moved to [place] for college at the . He is close to parents, especially mom.  Family Mental Health History-  Mom has OCD, maternal family hx of Anxiety, Depression, Mood disorders and CD    PAST PSYCH MED TRIALS     Lexapro  Hydroxyzine   Zoloft  Clonazepam    MEDICAL  / SURGICAL HISTORY                                   CARE TEAM:          PCP- Dr Lewis in [place]                 Therapist- none    Patient Active Problem List   Diagnosis     Labral tear of shoulder     Right shoulder pain       ALLERGY                                Amoxicillin-pot clavulanate, Oxycodone, and Doxycycline  MEDICATIONS                               Current Outpatient Medications   Medication Sig Dispense Refill     escitalopram (LEXAPRO) 20 MG tablet Take 20 mg by mouth daily         VITALS   There were no vitals taken for this visit.   MENTAL STATUS EXAM                                                             Alertness: alert  and oriented  Appearance: casually groomed  Behavior/Demeanor: cooperative and pleasant, with good  eye contact   Speech: normal and regular rate and rhythm  Language: intact and no problems  Psychomotor: fidgety  Mood: neutral, but anxious when discussing certain topics  Affect: restricted, labile and appropriate; was congruent to mood; was congruent to content  Thought Process/Associations: unremarkable  Thought Content:  Reports preoccupations, over-valued ideas and this is related to anxiety related topics;  Denies suicidal and violent ideation and delusions  Perception:  Reports none;  Denies auditory hallucinations and visual hallucinations  Insight: fair  Judgment: good  Cognition: does  appear grossly intact; formal cognitive testing was not done    LABS and DATA       PHQ9 TODAY = N/A  No flowsheet data found.      PSYCHIATRIC DIAGNOSES                                                                                                   ADHD,   GAVIN with panic attacks  Unspecified depression, by hx    ASSESSMENT                                   Barrett Bazan is a 22 year old male with a hx of ADHD ad Anxiety who  is self referred to the [facility] for a medication management consult. Genetic loading for MH is significant for anxiety, mood disorders and CD. There are  medical contributors to presentation as patient had a right glenoid labrum injury prior to evolution of symptoms, with chronic shoulder pain and surgery in May, and is still recovering. He has had significant medical work-up to r/o organic causes and these were all negative. Critical item hx is significant for one prior hospitalization in 2019 for depression with SI. Substance use plays a role in his presentation.  Psychosocial stressors include - academics, transitioms chronic MH symptoms. Patient is help seeking, bright highly motivated and insightful and future-oriented.     Today, Patient does meet criteria for Anxiety with panic attacks. ADHD diagnosis was not explored specifically although he does appear to struggle more recently with chronic poor attention/focus, distractibility in the absence of his stimulants. Encourage a discussion with his PCP to determine if a re-trial at most recent dose ( or lower if not tolerated ) could be indicated since patient's anxiety is better controlled.     In terms of anxiety, these appear to be better controlled with recent clinical response to last medication dose adjustment. Pt is interested in therapy and would be a good candidate for CBT, to learn effective coping skills to manage his anxiety and negative ruminations. Provide psychoeducation on potential risks of long term use of lorazepam as a PRN, and potential benefits of other anxiolytics like hydroxyzine and behavioral interventions such as TIPP skills for management of anxiety.  Patient recently initiated care with therapist regarding but is unclear if planned modality of treatment is support vs CBT ( which is my recommendation for appropriate management of his anxiety). Encourage a discussion to address this need. Discuss plan for pt to monitor these panic attacks, paying more attention to triggers, and try to curb these symptoms before they escalate using aforementioned coping skills. Counseled on substance use  and need for sobriety for meds to be more effective. No safety concerns today.                               PLAN                                                                                                       1) MEDICATION:      - Continue meds per PCP    2) THERAPY:  Start    3) LABS NEXT DUE:  none       RATING SCALES:     N/A    4) REFERRALS [CD, medical, other]:  none    5) :  none    6) RTC: N/A, consult only. Pt has PCP for med mgt.    7) CRISIS NUMBERS: Provided in AVS today  Crisis Text Line for any crisis 24/7 send this-   To: [id number]   [facility]  [phone]      TREATMENT RISK STATEMENT:  The risks, benefits, alternatives and potential adverse effects have been discussed and are understood by the patient/ patient's guardian. The pt understands the risks of using street drugs or alcohol.  There are no medical contraindications, the pt agrees to treatment with the ability to do so.  The patient understands to call 911 or come to the nearest ED if life threatening or urgent symptoms present.        PROVIDER  Harper Naylor MD, MPH

## 2022-09-20 ENCOUNTER — VIRTUAL VISIT (OUTPATIENT)
Dept: PSYCHIATRY | Facility: CLINIC | Age: 22
End: 2022-09-20
Attending: PSYCHOLOGIST
Payer: COMMERCIAL

## 2022-09-20 DIAGNOSIS — F41.0 PANIC DISORDER WITHOUT AGORAPHOBIA: Primary | ICD-10-CM

## 2022-09-20 PROCEDURE — 90837 PSYTX W PT 60 MINUTES: CPT | Mod: 95

## 2022-09-22 NOTE — PROGRESS NOTES
"     [facility] Psychiatry Clinic    Psychotherapy Progress Note      Date: Sep 20, 2022    Patient name: Barrett Bazan     Patient MRN: [id number]    Provider: Smita Abbott MD    Procedure: Individual psychotherapy session    Session length: 53 minutes (start:1600 , stop:1653 ).    Video-Visit Details    Type of service:  Video Visit    Video Start Time (time video started) 1600    Video End Time (time video stopped): 1653    Originating Location (pt. Location): Home    Distant Location (provider location):  Off-site    Mode of Communication:  Video Conference via [employer]    Physician has received verbal consent for a Video Visit from the patient? Yes      Rohini Ramírez, PhD LP      Session #2      Session content: A genogram of client's family was developed. He talked about his background and his upbringing. The family dynamics were discussed. Identified difficulties during school and how he coped. At the end of the session TIP skills were reviewed using a handout.    Behavioral assignment:  N/A     Behavioral observations/mental status:   Patient arrived on time to session. Patient was appropriately groomed and dressed. Eye contact was good. Mood today was \"without any issues\". Observed affect was bright, full and reactive and was consistent throughout the session. Speech was normal in tone, rate and volume. Thought process was linear, logical, goal oriented. Patient was actively engaged in session.    MENTAL STATUS EXAM  Appearance: appropriate  Attitude: cooperative and engaged  Behavior: pleasant  Eye Contact: normal  Speech: normal  Orientation: oreinted to person , place, time and situation  Mood:  \"no issues\"  Affect: Mood Congruent, euthymic, reactive  Thought Process: clear, logical and organized  Suicidal Ideation: reports thoughts, no intention  Hallucination: no       Risk assessment: Patient reported an absence  of suicidal ideation in today's " "session.. Based on risk and protective factors, patient is assessed to be at a low level of risk for suicide.     Risk factors: Gender, race, single, depression  Protective factors: absence of SI, no prior SI, stable housing, social support, no access to lethal means       Diagnoses treated:  Encounter Diagnosis   Name Primary?     Panic disorder without agoraphobia Yes    blows in from plan \"visit diagnosis\"     Plan: Continue weekly individual supportive psychotherapy for the above diagnoses.    Aguilar Mccracken, PGY-2 (Psychiatry)  [facility]      "

## 2022-10-03 ENCOUNTER — HEALTH MAINTENANCE LETTER (OUTPATIENT)
Age: 22
End: 2022-10-03

## 2022-10-04 ENCOUNTER — VIRTUAL VISIT (OUTPATIENT)
Dept: PSYCHIATRY | Facility: CLINIC | Age: 22
End: 2022-10-04
Attending: PSYCHOLOGIST
Payer: COMMERCIAL

## 2022-10-04 DIAGNOSIS — F41.0 PANIC DISORDER WITHOUT AGORAPHOBIA: Primary | ICD-10-CM

## 2022-10-04 PROCEDURE — 90837 PSYTX W PT 60 MINUTES: CPT | Mod: 95

## 2022-10-05 NOTE — PROGRESS NOTES
"     [facility] Psychiatry Clinic    Psychotherapy Progress Note      Date: Oct 4, 2022    Patient name: Barrett Bazan     Patient MRN: [id number]    Provider: Smita Abbott MD    Procedure: Individual psychotherapy session    Session length: 53 minutes (start:1603 , stop:1656 ).  Video-Visit Details    Type of service:  Video Visit    Video Start Time (time video started): 16:03    Video End Time (time video stopped): 16:56    Originating Location (pt. Location): Home        Distant Location (provider location):  On-site    Mode of Communication:  Video Conference via [employer]    Physician has received verbal consent for a Video Visit from the patient? Yes            Session #3      Session content: Barrett caught the writer up with the events over the past 2 weeks. He shared a recent anxiety attack and the steps he took to ground himself. He was able to use cold ice pack technic which sounds to be effective. The current stressors regarding his goal to change major was reviewed. He expressed his interest in starting CBT sometime soon and appropriate instructions were provided.    Behavioral assignment:  None     Behavioral observations/mental status:     Patient arrived on time to session. Patient was appropriately groomed and dressed. Eye contact was appropriate. Mood today was \"fine\". Observed affect was euthymic and reactive throughout the session. Speech was normal in tone, rate and volume. Thought process was linear, logical, goal oriented. Patient was actively engaged in session.    MENTAL STATUS EXAM  Appearance: appropriate  Attitude: cooperative and engaged  Behavior: normal  Eye Contact: good  Speech: normal  Orientation: oreinted to person , place, time and situation  Mood:  \"fine\"  Affect: Mood Congruent, euthymic and reactive  Thought Process: logical and organized  Suicidal Ideation: denies thoughts/intention/plan  Hallucination: no       Risk " assessment:  Based on risk and protective factors, patient is assessed to be at a low level of risk for suicide.     Risk factors: Race, gender, anxiety disorder  Protective factors: No SI, no prior SI or attempt, no current mood symptoms, help seeking, lack of access to lethal means        Diagnoses treated:  Encounter Diagnosis   Name Primary?     Panic disorder without agoraphobia Yes         Plan: Continue weekly individual supportive psychotherapy for the above diagnoses.       Aguilar Mccracken, PGY-2 (Psychiatry)  [facility]

## 2022-10-11 ENCOUNTER — VIRTUAL VISIT (OUTPATIENT)
Dept: PSYCHIATRY | Facility: CLINIC | Age: 22
End: 2022-10-11
Attending: PSYCHOLOGIST
Payer: COMMERCIAL

## 2022-10-11 DIAGNOSIS — F41.0 PANIC DISORDER WITHOUT AGORAPHOBIA: Primary | ICD-10-CM

## 2022-10-11 PROCEDURE — 90834 PSYTX W PT 45 MINUTES: CPT | Mod: 95

## 2022-10-18 ENCOUNTER — VIRTUAL VISIT (OUTPATIENT)
Dept: PSYCHIATRY | Facility: CLINIC | Age: 22
End: 2022-10-18
Attending: PSYCHOLOGIST
Payer: COMMERCIAL

## 2022-10-18 DIAGNOSIS — F41.0 PANIC DISORDER WITHOUT AGORAPHOBIA: Primary | ICD-10-CM

## 2022-10-18 PROCEDURE — 90832 PSYTX W PT 30 MINUTES: CPT | Mod: 95

## 2022-10-18 NOTE — PROGRESS NOTES
"     [facility]    Psychotherapy Progress Note      Date: Oct 18, 2022    Patient name: Barrett Bazan     Patient MRN: [id number]    Provider: Smita Abbott MD    Procedure: Individual psychotherapy session    Session length: 25 minutes ([id number]: , stop:1630 ).  Video-Visit Details    Type of service:  Video Visit    Video Start Time (time video started): 1605    Video End Time (time video stopped):1630    Originating Location (pt. Location): Home    Distant Location (provider location):  On-site    Mode of Communication:  Video Conference via Voltea    Physician has received verbal consent for a Video Visit from the patient? Yes      Aguilar Mccracken MD      Session #5      Session content: Client talked about his recent viral illness and the fact that he's had to take OTC medication to help with symptoms. These has caused him some anxiety and insomnia at night. He reviewed the effective methods he utilized to go around it. Validation and encouragement as well as positive reinforcement was used in today's session.    Behavioral assignment:  N/A     Behavioral observations/mental status:   Patient arrived on time to session. Patient was appropriately groomed and dressed. Eye contact was appropriate. Mood today was \"OK\". Observed affect was euthymic and reactive and was consistent throughout the session. Speech was normal in tone, rate and volume. Thought process was linear, logical, goal oriented. Patient was actively engaged in session.    MENTAL STATUS EXAM  Appearance: dressed in casual fraternity T-shirt, well groomed  Attitude: engaged and cooperative  Eye Contact: normal  Speech: spontaneous, normal rate and rhythm and volume  Orientation: oreinted to person , place, time and situation  Mood:  admits to mild anxiety but is overall doing \"OK\"  Affect: Euthymic and reactive, full and broad, mood Congruent  Thought Process: logical and " "organized  Suicidal Ideation: reports thoughts, no intention  Hallucination: no       Risk assessment: Patient reported an absence  of suicidal ideation in today's session via the PHQ-9. Based on risk and protective factors, patient is assessed to be at a low level of risk for suicide.     Risk factors: Gender, Race, anxiety  Protective factors: no SI, No prior attempts, strong desire to live, future-oriented, strong social support from family, lives in a community, no substance use       Diagnoses treated:  Encounter Diagnosis   Name Primary?     Panic disorder without agoraphobia Yes    blows in from plan \"visit diagnosis\"     Plan: Continue weekly individual supportive psychotherapy for the above diagnoses.     Aguilar Mccracken, PGY-2 (Psychiatry)  [facility]    "

## 2022-10-19 NOTE — PROGRESS NOTES
"     [facility]    Psychotherapy Progress Note      Date: Oct 11, 2022    Patient name: Barrett Bazan     Patient MRN: [id number]    Provider: Smita Abbott MD    Procedure: Individual psychotherapy session    Session length: minutes (start:1602 , stop:1654 ).    Video-Visit Details    Type of service:  Video Visit    Video Start Time (time video started):16:02    Video End Time (time video stopped): 1654    Originating Location (pt. Location): Home    Distant Location (provider location):  On-site    Mode of Communication:  Video Conference via FlyBridGe    Physician has received verbal consent for a Video Visit from the patient? Yes          Session #4 w      Session content: Barrett shared his concerns about future specifically work place. Discussed the source of fear and what may have been feeding it. Prior work place experience was reviewed. Validation, positive reinforcement and emphasis on strengths was used by writer.    Behavioral assignment:  N/A     Behavioral observations/mental status:   Patient arrived on time to session. Patient was appropriately groomed and dressed. Eye contact was adequate. Mood today was \"pretty good\". Observed affect was euthymic and reactive and was consistent throughout the session. Speech was normal in tone, rate and volume. Thought process was linear, logical, goal oriented. Patient was actively engaged in session.    MENTAL STATUS EXAM  Appearance: Casual, appropriately groomed  Attitude: engaged  Eye Contact: normal  Speech: spontaneous, normal rate and rhythm and volume  Orientation: oreinted to person , place, time and situation  Mood: \"pretty good\"  Affect: Euthymic and reactive, full and broad, mood Congruent  Thought Process: logical and linear   Suicidal Ideation: reports thoughts, no intention  Hallucination: no       Risk assessment: Patient reported an absence  of suicidal ideation in today's session " "via the PHQ-9. Based on risk and protective factors, patient is assessed to be at a low level of risk for suicide.     Risk factors: Gender, Race, anxiety  Protective factors: no SI, No prior attempts, strong desire to live, future-oriented, strong social support from family, lives in a community, no substance use          Diagnoses treated:  Encounter Diagnosis   Name Primary?     Panic disorder without agoraphobia Yes    blows in from plan \"visit diagnosis\"     Plan: Continue weekly  individual supportive psychotherapy for the above diagnoses.     Aguilar Mccracken, PGY-2 (Psychiatry)  [facility]  "

## 2022-10-25 ENCOUNTER — VIRTUAL VISIT (OUTPATIENT)
Dept: PSYCHIATRY | Facility: CLINIC | Age: 22
End: 2022-10-25
Attending: PSYCHOLOGIST
Payer: COMMERCIAL

## 2022-10-25 DIAGNOSIS — F41.0 PANIC DISORDER WITHOUT AGORAPHOBIA: Primary | ICD-10-CM

## 2022-10-25 PROCEDURE — 90834 PSYTX W PT 45 MINUTES: CPT | Mod: 95

## 2022-10-25 NOTE — PROGRESS NOTES
"     [facility]    Psychotherapy Progress Note      Date: Oct 25, 2022    Patient name: Barrett Bazan     Patient MRN: [id number]    Provider: Smita Abbott MD    Procedure: Individual psychotherapy session    Session length: 50 minutes ([id number]: , stop:1650 ).    Video-Visit Details    Type of service:  Video Visit    Video Start Time (time video started): 1600    Video End Time (time video stopped):1650    Originating Location (pt. Location): Home    Distant Location (provider location):  On-site    Mode of Communication:  Video Conference via Peela    Physician has received verbal consent for a Video Visit from the patient? Yes      Rohini Ramírez, PhD LP    Session #6      Session content: Barrett appeared with a more depressed affect today. He stated that he had an intense panic attack a few days ago however was glad that it lasted about 5 mins. He was able to use TIP skills. We discussed about a recent relationship and how it has affected his anxiety. He was able to acknowledge more anxiety provoking thoughts and how talking through them could be beneficial. The coming two weeks are more stressful as midterm are coming up and there is an increase in academic assignment load.    Behavioral assignment:  N/A     Behavioral observations/mental status:   Patient arrived on time to session. Patient was appropriately groomed and dressed. Eye contact was appropriate. Mood today was \"alright\". Observed affect was depressed but reactive and was consistent throughout the session. Speech was normal in tone, rate and volume. Thought process was linear, logical, goal oriented. Patient was actively engaged in session.    Mental Status Examination:    Oriented to:  Grossly Oriented  General: Awake and Alert  Appearance:  appears stated age, Grooming is adequate and appropriate weight  Behavior:  calm, cooperative and engaged  Eye Contact:  " "adequate  Psychomotor:  no abnormal motor symptoms appreciated; no catatonia present  Speech:  appropriate volume/tone, spontaneous and with good articulation  Language: Fluent in English with appropriate syntax and vocabulary.  Mood:  \"alright\"  Affect:  appropriate, congruent with mood, stable, broad/full and sad  Thought Process:  coherent, linear, logical and goal directed  Thought Content: No SI/HI/AH/VH; No apparent delusions  Associations:  intact  Insight:  good  Judgment:  good  Attention Span: grossly intact  Concentration:  grossly intact  Fund of Knowledge: above average       Risk assessment: Patient reported an absence  of suicidal ideation in today's session via the PHQ-9. Based on risk and protective factors, patient is assessed to be at a low level of risk for suicide.     Risk factors: Gender, Race, anxiety disorder with panic attacks  Protective factors: no SI, No prior attempts, strong desire to live, future-oriented, strong social support from family, lives in a community, no substance use       Diagnoses treated:  Encounter Diagnosis   Name Primary?     Panic disorder without agoraphobia Yes    blows in from plan \"visit diagnosis\"     Plan: Continue weekly individual supportive psychotherapy for the above diagnoses.     Aguilar Mccracken, PGY-2 (Psychiatry)  [facility]    "

## 2022-11-01 ENCOUNTER — VIRTUAL VISIT (OUTPATIENT)
Dept: PSYCHIATRY | Facility: CLINIC | Age: 22
End: 2022-11-01
Attending: PSYCHOLOGIST
Payer: COMMERCIAL

## 2022-11-01 DIAGNOSIS — F41.0 PANIC DISORDER WITHOUT AGORAPHOBIA: Primary | ICD-10-CM

## 2022-11-01 PROCEDURE — 90834 PSYTX W PT 45 MINUTES: CPT | Mod: 95

## 2022-11-01 NOTE — PROGRESS NOTES
Video- Visit Details  Type of service:  video visit for mental health treatment.  Time of service:    Date:  11/01/2022    Video Start Time:  1600        Video End Time:  1640    Reason for video visit: COVID-19  Originating Site (patient location):  [facility]  Distant Site (provider location):  Psychiatry Clinic  Mode of Communication:  Video Conference via goBramble        Consent: This telephone visit will be conducted via a call between the patient and his/her physician/provider. We have found that certain health care needs can be provided without the need for a physical exam. This service lets us provide the care the patient needs with a short phone conversation.  If a prescription is necessary we can send it directly to the patient's pharmacy.  If lab work is needed we can place an order for that and the patient can then stop by our lab to have the test done at a later time. If during the course of the call the physician/provider feels a telephone visit is not appropriate, the patient will not be charged for this service. The patient/guardian has verbally consented to: the potential risks and benefits of telemedicine (phone) versus in person care; bill my insurance or make self-payment for services provided; and responsibility for payment of non-covered services.     How would the patient like to obtain the AVS?  Martha    As the provider I attest to compliance with applicable laws and regulations related to telemedicine.    OUTPATIENT PSYCHOTHERAPY PROGRESS NOTE    Client Name: Barrett Bazan   YOB: 2000 (22 year old)   Date of Service:  Nov 1, 2022  Time of Service: 1600 to 1640 (40 minutes)  Service Type(s):18518 psychotherapy (40 min. with patient)    Diagnoses:   No diagnosis found.    Individuals Present:   Client attended alone    Treatment goal(s) being addressed:  1) Reassurance and reducing panic attacks    Subjective:  Barrett had a difficult weekend. On Sunday he had a panic  "attack that lasted longer than usual. Barrett was able to successfully use the coping skills to abort the attack. Barrett has been \"busy\" due to increase in academic assignments and mid-term season. In this session topics of romantic relationship, obstacles for making new connections and emotional experiences were discussed. Positive reinforcement, reassurance and validation was provided.    Treatment:     Supportive therapy     Assessment and Progress:   Barrett is a 22 year-old with history of depression and anxiety who is referred by psychiatrist for supportive psychotherapy with the working diagnosis of panic disorder. Barrett meets with therapist on a weekly basis to continue working on the treatment goal.    Plan:   Next therapy appointment has been scheduled for next week to continue work on treatment goals.    Treatment Plan          SYMPTOMS; PROBLEMS   MEASURABLE GOALS;    FUNCTIONAL IMPROVEMENT INTERVENTIONS;   GAINS MADE DISCHARGE CRITERIA   Panic Attacks: dizziness, choking sensation, racing heart and fear   reduce panic attacks/ excessive worry acceptance of limitations/reality  psycho-education   psychotherapy  relaxation techniques  marked symptom improvement and reduced visit frequency     Date of most recent treatment plan: 9/13/22  Date next treatment plan due: 3 months    Patient did not report any changes to medications    Psychotherapy services during this visit included myself and the patient. Patient agreed with treatment plan on 11/1 . Discussed case with supervisor who also agreed with the treatment plan. Unable to sign in person due to visit being conducted through telehealth.     Aguilar Mccracken MD  PGY2 Psychiatry    Case discussed with attending therapist who was not present in person.       "

## 2022-11-08 ENCOUNTER — APPOINTMENT (OUTPATIENT)
Dept: PSYCHIATRY | Facility: CLINIC | Age: 22
End: 2022-11-08
Attending: PSYCHOLOGIST
Payer: COMMERCIAL

## 2022-11-15 ENCOUNTER — VIRTUAL VISIT (OUTPATIENT)
Dept: PSYCHIATRY | Facility: CLINIC | Age: 22
End: 2022-11-15
Attending: PSYCHOLOGIST
Payer: COMMERCIAL

## 2022-11-15 DIAGNOSIS — F41.0 PANIC DISORDER WITHOUT AGORAPHOBIA: Primary | ICD-10-CM

## 2022-11-15 PROCEDURE — 90834 PSYTX W PT 45 MINUTES: CPT | Mod: 95

## 2022-11-15 NOTE — PROGRESS NOTES
Video- Visit Details  Type of service:  video visit for mental health treatment.  Time of service:    Date:  11/15/2022    Video Start Time:  1605        Video End Time:  1650    Reason for video visit: COVID-19  Originating Site (patient location):  [facility]  Distant Site (provider location):  Psychiatry Clinic  Mode of Communication:  Video Conference via [employer]        Consent: This telephone visit will be conducted via a call between the patient and his/her physician/provider. We have found that certain health care needs can be provided without the need for a physical exam. This service lets us provide the care the patient needs with a short phone conversation.  If a prescription is necessary we can send it directly to the patient's pharmacy.  If lab work is needed we can place an order for that and the patient can then stop by our lab to have the test done at a later time. If during the course of the call the physician/provider feels a telephone visit is not appropriate, the patient will not be charged for this service. The patient/guardian has verbally consented to: the potential risks and benefits of telemedicine (phone) versus in person care; bill my insurance or make self-payment for services provided; and responsibility for payment of non-covered services.     How would the patient like to obtain the AVS?  Chaset    As the provider I attest to compliance with applicable laws and regulations related to telemedicine.    OUTPATIENT PSYCHOTHERAPY PROGRESS NOTE    Client Name: Barrett Bazan   YOB: 2000 (22 year old)   Date of Service:  Nov 1, 2022  Time of Service: 1605 to 1650 (45 minutes)  Service Type(s):83292 psychotherapy (45 min. with patient)    Diagnoses:   Persistent Depression    Individuals Present:   Client attended alone    Treatment goal(s) being addressed:  1) Reassurance and reducing panic attacks    Subjective:  Barrett presented with an anxious and sad affect. He  mentioned that it's been three weeks of increased anxiety over school workload. He added that he has been taking his Lorazepam more often and that may be a sign of him not doing well. Barrett had a new presentation of panic attack which triggered obsessive symptoms. In this session topics of obstacles for making new connections and emotional experiences were discussed. Positive reinforcement, reassurance and validation was provided.    Treatment:     Supportive therapy     Assessment and Progress:   Barrett is a 22 year-old with history of depression and anxiety who is referred by psychiatrist for supportive psychotherapy with the working diagnosis of panic disorder. Barrett meets with therapist on a weekly basis to continue working on the treatment goal.    Plan:   Next therapy appointment has been scheduled for next week to continue work on treatment goals.    Treatment Plan          SYMPTOMS; PROBLEMS   MEASURABLE GOALS;    FUNCTIONAL IMPROVEMENT INTERVENTIONS;   GAINS MADE DISCHARGE CRITERIA   Panic Attacks: dizziness, choking sensation, racing heart and fear   reduce panic attacks/ excessive worry acceptance of limitations/reality  psycho-education   psychotherapy  relaxation techniques  marked symptom improvement and reduced visit frequency     Date of most recent treatment plan: 9/13/22  Date next treatment plan due: 3 months    Patient did not report any changes to medications    Psychotherapy services during this visit included myself and the patient. Patient agreed with treatment plan on 11/8 . Discussed case with supervisor who also agreed with the treatment plan. Unable to sign in person due to visit being conducted through telehealth.     Aguilar Mccracken MD  PGY2 Psychiatry    Case discussed with attending therapist who was not present in person.

## 2022-12-06 ENCOUNTER — VIRTUAL VISIT (OUTPATIENT)
Dept: PSYCHIATRY | Facility: CLINIC | Age: 22
End: 2022-12-06
Attending: PSYCHOLOGIST
Payer: COMMERCIAL

## 2022-12-06 DIAGNOSIS — F32.5 DEPRESSION, MAJOR, IN REMISSION (H): Primary | ICD-10-CM

## 2022-12-06 PROCEDURE — 90837 PSYTX W PT 60 MINUTES: CPT | Mod: 95

## 2022-12-09 ENCOUNTER — TELEPHONE (OUTPATIENT)
Dept: PSYCHIATRY | Facility: CLINIC | Age: 22
End: 2022-12-09

## 2022-12-09 NOTE — TELEPHONE ENCOUNTER
"Haja Fall,     This patient was seen for a med consult, he has a PCP who prescribes/refills and monitors his meds especially the controlled substance. Please see note on 11/9 under assessment for more details. He lives in [place] and should be able to get in to see his PCP for next steps.                Harper Naylor MD Stephenson, Heather L RN  Caller: Unspecified (Today,  9:45 AM)  I just checked now and saw that he did see his PCP on 10/21 after his visit with me and they do have a plan to check in. Per Dr Andrew, \" Please see me for well visit when you are home over your Jack break. Please call sooner with any worsening symptoms. \"     Nothing to do on our end, he was just a 1- time consult, may be why he has not been responding and no-showing so please let others know. Thanks     Follow up: notified patient to follow up with PCP. Patient will call them.   "

## 2022-12-09 NOTE — TELEPHONE ENCOUNTER
Attempted to call patient.  No answer. Left voicemail to call back clinic at [phone].     Hi all,     I noticed Marybeth's schedule says vacation for the next 5 weeks but this pt needs to be seen. He no showed his last two appointments with her too. Do we know who is covering? And can someone give him a check in call regarding the side effects?     Thanks,   Izzy            Appointment Request From: Barrett Bazan     With Provider: Harper Naylor MD [[facility]]     Preferred Date Range: 12/8/2022 - 12/20/2022     Preferred Times: Monday Morning, Tuesday Morning, Wednesday Morning, Thursday Morning, Friday Morning, Friday Afternoon     Reason for visit: Request an Appointment     Comments:  - side effects of antidepressant (muscle twitching, brain zaps)  - lorazepam refill

## 2022-12-13 PROBLEM — M25.511 RIGHT SHOULDER PAIN: Status: RESOLVED | Noted: 2022-03-25 | Resolved: 2022-12-13

## 2022-12-13 PROBLEM — S43.439A LABRAL TEAR OF SHOULDER: Status: RESOLVED | Noted: 2022-03-25 | Resolved: 2022-12-13

## 2022-12-13 PROBLEM — F32.5 DEPRESSION, MAJOR, IN REMISSION (H): Status: ACTIVE | Noted: 2022-12-13

## 2022-12-13 NOTE — PROGRESS NOTES
Video- Visit Details  Type of service:  video visit for mental health treatment.  Time of service:    Date:  12/6/2022    Video Start Time:  1607        Video End Time:  1700    Reason for video visit: Supportive psychotherapy  Originating Site (patient location):  [facility]  Distant Site (provider location):  Home Office  Mode of Communication:  Video Conference via [facility]        Consent: This telephone visit will be conducted via a call between the patient and his/her physician/provider. We have found that certain health care needs can be provided without the need for a physical exam. This service lets us provide the care the patient needs with a short phone conversation. If a prescription is necessary we can send it directly to the patient's pharmacy.  If lab work is needed we can place an order for that and the patient can then stop by our lab to have the test done at a later time. If during the course of the call the physician/provider feels a telephone visit is not appropriate, the patient will not be charged for this service. The patient/guardian has verbally consented to: the potential risks and benefits of telemedicine (phone) versus in person care; bill my insurance or make self-payment for services provided; and responsibility for payment of non-covered services.     How would the patient like to obtain the AVS?  Martha    As the provider I attest to compliance with applicable laws and regulations related to telemedicine.    OUTPATIENT PSYCHOTHERAPY PROGRESS NOTE    Client Name: Barrett Bazan   YOB: 2000 (22 year old)   Date of Service:  Nov 15, 2022  Time of Service: 83368 to 1700 (53 minutes)  Service Type(s):88635 psychotherapy (53 min. with patient)    Diagnoses:   Encounter Diagnosis   Name Primary?     Depression, major, in remission (H) Yes       Individuals Present:   Client attended alone    Treatment goal(s) being addressed:  1) Reassurance and reducing panic  attacks    Subjective:  Barrett joined the session from a private room in a new building where he had moved to yesterday. 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 He made the decision to move to the new building with the help of his mother. He plans to take advantage of the quiet and prepare for the finals that are coming up early next week. He is coming down with a three week Flu and his appetite has been poor. Anxiety and depression are at baseline and he denied having any SI/HI/AVH    In today's session we discussed anxiety over school work and role of friends and family in controlling that. Positive reinforcement, reassurance and validation was provided.    Treatment:     Supportive therapy     Assessment and Progress:   Barrett is a 22 year-old young man, oldest son of a family of four from [place] with history of depression and anxiety who is referred by psychiatrist for supportive psychotherapy with the working diagnosis of panic disorder. Barrett meets with therapist on a weekly basis to continue working on the treatment goal.    Plan:   Next therapy appointment has been scheduled for next week to continue work on treatment goals.    Treatment Plan          SYMPTOMS; PROBLEMS   MEASURABLE GOALS;    FUNCTIONAL IMPROVEMENT INTERVENTIONS;   GAINS MADE DISCHARGE CRITERIA   Panic Attacks: dizziness, choking sensation, racing heart and fear   reduce panic attacks/ excessive worry acceptance of limitations/reality  psycho-education   psychotherapy  relaxation techniques  marked symptom improvement and reduced visit frequency     Date of most recent treatment plan: 9/13/22  Date next treatment plan due: 3 months    Patient did not report any changes to medications    Psychotherapy services during this visit included myself and the patient. Patient agreed with treatment plan on 12/6/22. Discussed case with supervisor who also agreed with  the treatment plan. Unable to sign in person due to visit being conducted through telehealth.     Aguilar Mccracken MD  PGY2 Psychiatry    Case will be discussed with attending therapist who was not present in person.

## 2022-12-21 ENCOUNTER — TELEPHONE (OUTPATIENT)
Dept: PSYCHIATRY | Facility: CLINIC | Age: 22
End: 2022-12-21

## 2022-12-21 NOTE — TELEPHONE ENCOUNTER
PSYCHIATRY CLINIC PHONE INTAKE     SERVICES REQUESTED / INTERESTED IN          Med Management    Presenting Problem and Brief History                              What would you like to be seen for? (brief description):  Former patient of Dr. Naylor.  Is looking to see someone else moving forward  Have you received a mental health diagnosis? Yes   Which one (s): See chart  Is there any history of developmental delay?  No   Are you currently seeing a mental health provider?  Yes            Who / month last seen:  Current patient in [facility]  Do you have mental health records elsewhere?  Yes  Will you sign a release so we can obtain them?  Yes    Have you ever been hospitalized for psychiatric reasons?  Yes  Describe:  Depression with SI    Do you have current thoughts of self-harm?  No    Do you currently have thoughts of harming others?  No       Substance Use History     Do you have any history of alcohol / illicit drug use?  No  Describe:  NA  Have you ever received treatment for this?  No    Describe:  NA     Social History     Who is the patient's a guardian?  Yes    Name / number: SELF  Have you had an ACT team in last 12 months?  No  Describe: NA   OK to leave a detailed voicemail?  Yes    Would you be interested in learning more about research opportunities for which you or your child may qualify? We can connect you with a team member for more information.  No  If yes, send an inbasket message to Ebony Thurston    Medical/ Surgical History                                   Patient Active Problem List   Diagnosis     Depression, major, in remission (H)          Medications             Current Outpatient Medications   Medication Sig Dispense Refill     escitalopram (LEXAPRO) 20 MG tablet Take 20 mg by mouth daily           DISPOSITION      Updated intake phone screen completed on 12/21/22.  Patient is a former patient of Dr. Naylor and is currently seeing a resident for therapy.  New patient  email sent to address on file.    Elisa Espitia Sr.

## 2022-12-27 ENCOUNTER — VIRTUAL VISIT (OUTPATIENT)
Dept: PSYCHIATRY | Facility: CLINIC | Age: 22
End: 2022-12-27
Attending: PSYCHOLOGIST
Payer: COMMERCIAL

## 2022-12-27 DIAGNOSIS — F32.5 DEPRESSION, MAJOR, IN REMISSION (H): Primary | ICD-10-CM

## 2022-12-27 PROCEDURE — 90832 PSYTX W PT 30 MINUTES: CPT | Mod: 95

## 2022-12-29 NOTE — PROGRESS NOTES
Video- Visit Details  Type of service:  video visit for mental health treatment.  Time of service:    Date:  12/27/2022    Video Start Time:  4:05 PM        Video End Time:  4:30 PM    Reason for video visit: COVID-19  Originating Site (patient location):  [facility] in [place]  Distant Site (provider location):  Provider's home office  Mode of Communication:  Video Conference via [employer]      Consent: This virtual video visit will be conducted via a video call between the patient and his physician. We have found that certain health care needs can be provided without the need for a physical exam. This service lets us provide the care the patient needs with a short phone conversation.  If a prescription is necessary we can send it directly to the patient's pharmacy.  If lab work is needed we can place an order for that and the patient can then stop by our lab to have the test done at a later time. If during the course of the video call the physician/provider feels a video visit is not appropriate, the patient will not be charged for this service. The patient has verbally consented to: the potential risks and benefits of telemedicine (video) versus in person care; bill my insurance or make self-payment for services provided; and responsibility for payment of non-covered services.     How would the patient like to obtain the AVS?  Martha    As the provider I attest to compliance with applicable laws and regulations related to telemedicine.    OUTPATIENT PSYCHOTHERAPY PROGRESS NOTE    Client Name: Barrett Bazan   YOB: 2000 (22 year old)   Date of Service:  Dec 27, 2022  Time of Service: 4:05 pm to 4:30 pm (25 minutes)  Service Type(s):81047 psychotherapy (16-37 min. with patient)    Diagnoses:   Encounter Diagnosis   Name Primary?     Depression, major, in remission (H) Yes       Individuals Present:   Client attended alone    Treatment goal(s) being addressed:  1) Reassurance and reducing panic attacks  2)  Improving stress management capacity at school    Subjective:  Barrett joined the meeting from a gym in his hometown in [place]. He stated that he had forgotten about the session but was able to connect regardless. His affect was bright and broad/full with appropriate reactivity.     He got back home a few days ago for the winter break and was planning to stay until mid-Jan. He rated his anxiety at 2/10 and depression 0/10 however when in [place], before going home he felt his depression was coming back with the intensity of 7/10. He attributed it to the school work and time of the year when he was very busy with finals. He denied having SI/SIB/HI/AVH. He also believes the medication he is on is not working as it was and had made an appointment to see his psychiatrist end of Jan. He endorsed that his anxiey and depression is situational. He not being active, not having time to focus on self and lack of fulfilling social interactions were noted to be contributing to his poor mental health.     Barrett c/o having prolonged episodes of upper extremities numbness that can take up to an hour to go away. He has been using technics like distracting, self-reassurance that have been partially helpful. He has not been taking Lorazepam in the past week.    Barrett pointed out the privileges he has when he is back home where he gets to spend more time focusing on himself, being away from the anxiety-provoking environment of school and getting to be close with family and spending quality time with them. He was able to name aspects of his life that he is grateful for and defined a smooth transition back to school a short-term goal for him.      Treatment:   Supportive therapy     Assessment and Progress:   Barrett is a 22 year-old with history of depression and anxiety who is referred by psychiatrist for supportive psychotherapy with the working diagnosis of panic disorder. Barrett meets with therapist on a weekly basis to continue  working on the treatment goal.    Plan:   Next therapy appointment has been scheduled in two weeks to continue work on treatment goals.    Treatment Plan          SYMPTOMS; PROBLEMS   MEASURABLE GOALS;    FUNCTIONAL IMPROVEMENT INTERVENTIONS;   GAINS MADE DISCHARGE CRITERIA   Depression: depressed mood, appetite changes and concentration problems  Panic Attacks: dizziness, choking sensation, racing heart and fear  Psychosocial: limited social support   reduce depressive symptoms, reduce panic attacks/ excessive worry and take steps to improve support network acceptance of limitations/reality  psycho-education   psychotherapy  relaxation techniques   self-care skills  strength focus marked symptom improvement and reduced visit frequency     Date of most recent treatment plan: 12/27/22  Date next treatment plan due: 3 months    Patient did not report any changes to medications    Psychotherapy services during this visit included myself and the patient. Patient agreed with treatment plan on 12/27/22 . Discussed case with supervisor who also agreed with the treatment plan. Unable to sign in person due to visit being conducted through telehealth.     Aguilar Mccracken MD  PGY2 Psychiatry    Case discussed with attending therapist who was not present in person.

## 2023-01-17 ENCOUNTER — VIRTUAL VISIT (OUTPATIENT)
Dept: PSYCHIATRY | Facility: CLINIC | Age: 23
End: 2023-01-17
Attending: PSYCHOLOGIST
Payer: COMMERCIAL

## 2023-01-17 DIAGNOSIS — F32.5 DEPRESSION, MAJOR, IN REMISSION (H): Primary | ICD-10-CM

## 2023-01-17 PROCEDURE — 90834 PSYTX W PT 45 MINUTES: CPT | Mod: 95

## 2023-01-24 ENCOUNTER — VIRTUAL VISIT (OUTPATIENT)
Dept: PSYCHIATRY | Facility: CLINIC | Age: 23
End: 2023-01-24
Attending: PSYCHOLOGIST
Payer: COMMERCIAL

## 2023-01-24 DIAGNOSIS — F32.5 DEPRESSION, MAJOR, IN REMISSION (H): Primary | ICD-10-CM

## 2023-01-24 PROCEDURE — 90832 PSYTX W PT 30 MINUTES: CPT | Mod: VID

## 2023-01-25 NOTE — PROGRESS NOTES
Video- Visit Details  Type of service:  video visit for mental health treatment.  Time of service:    Date:  1/24/2023    Video Start Time:  3:03 PM        Video End Time: 3:31 PM    Reason for video visit: Client preference  Originating Site (patient location): [facility]  Distant Site (provider location):  Provider's home office  Mode of Communication:  Video Conference via [employer]      Consent: This virtual video visit will be conducted via a video call between the patient and his physician. We have found that certain health care needs can be provided without the need for a physical exam. This service lets us provide the care the patient needs with a short phone conversation.  If a prescription is necessary we can send it directly to the patient's pharmacy.  If lab work is needed we can place an order for that and the patient can then stop by our lab to have the test done at a later time. If during the course of the video call the physician/provider feels a video visit is not appropriate, the patient will not be charged for this service. The patient has verbally consented to: the potential risks and benefits of telemedicine (video) versus in person care; bill my insurance or make self-payment for services provided; and responsibility for payment of non-covered services.     How would the patient like to obtain the AVS?  Martha    As the provider I attest to compliance with applicable laws and regulations related to telemedicine.    OUTPATIENT PSYCHOTHERAPY PROGRESS NOTE    Client Name: Barrett Bazan   YOB: 2000 (22 year old)   Date of Service:  Dec 27, 2022  Time of Service: 3:03 pm to 3:31 pm (28 minutes)  Service Type(s):61643 psychotherapy (16-37 min. with patient)    Diagnoses:   Encounter Diagnosis   Name Primary?     Depression, major, in remission (H) Yes       Individuals Present:   Client attended alone    Treatment goal(s) being addressed:  1) Reassurance and reducing  "panic attacks  2) Improving stress management capacity at school  3) Increase engagement in activities that can improve mood    Subjective:  Barrett joined the video meeting from the [employer] in the fr[place]. His outfit was appropriate for the weather, clean and tidy. He was pleasant with a depressed affect. Barrett was happy that the current medication regimen has been helpful in controlling his anxiety. He denied having experience any episode of anxiety attack since last visit. He was concerned about worsening depression. He stated he feels safe and denied having SI/SIB/HI/AVH. He said he experiences depression as \"feeling down\", lack of interests in what he enjoys doing and at its worst he can feel it in his body, \"head getting heavy\". His depression is fluctuating day by day. He was able to name different technics that he used today to lift his mood. Validation was provided. He also agreed that he has more control over his mood than he believes he has. Positive reinforcement and emphasis on mature and effective coping mechanisms discussed.    Treatment:   Supportive therapy     Assessment and Progress:   Barrett is a 22 year-old with history of depression and anxiety who is referred by psychiatrist for supportive psychotherapy with the working diagnosis of depressive disorder in remission. Barrett meets with therapist on a weekly basis to continue working on the treatment goals.    Plan:   Next therapy appointment has been scheduled in one week to continue work on treatment goals.    Treatment Plan          SYMPTOMS; PROBLEMS   MEASURABLE GOALS;    FUNCTIONAL IMPROVEMENT INTERVENTIONS;   GAINS MADE DISCHARGE CRITERIA   Depression: depressed mood, appetite changes and concentration problems  Panic Attacks: dizziness, choking sensation, racing heart and fear  Psychosocial: limited social support   reduce depressive symptoms, reduce panic attacks/ excessive worry and take steps to improve support network " acceptance of limitations/reality  psycho-education   psychotherapy  relaxation techniques   self-care skills  strength focus marked symptom improvement and reduced visit frequency     Date of most recent treatment plan: 12/27/22  Date next treatment plan due: 3 months    Patient did not report any changes to medications    Psychotherapy services during this visit included myself and the patient. Patient agreed with treatment plan. Discussed case with supervisor who also agreed with the treatment plan. Unable to sign in person due to visit being conducted through telehealth.     Aguilar Mccracken MD  PGY2 Psychiatry    Case discussed with attending therapist who was not present in person.

## 2023-01-25 NOTE — PROGRESS NOTES
Video- Visit Details  Type of service:  video visit for mental health treatment.  Time of service:    Date:  1/17/2022    Video Start Time:  5:10 PM        Video End Time:  5:55 PM    Reason for video visit: Client preference  Originating Site (patient location): Client apartment  Distant Site (provider location):  [facility]  Mode of Communication:  Video Conference via [employer]      Consent: This virtual video visit will be conducted via a video call between the patient and his physician. We have found that certain health care needs can be provided without the need for a physical exam. This service lets us provide the care the patient needs with a short phone conversation.  If a prescription is necessary we can send it directly to the patient's pharmacy.  If lab work is needed we can place an order for that and the patient can then stop by our lab to have the test done at a later time. If during the course of the video call the physician/provider feels a video visit is not appropriate, the patient will not be charged for this service. The patient has verbally consented to: the potential risks and benefits of telemedicine (video) versus in person care; bill my insurance or make self-payment for services provided; and responsibility for payment of non-covered services.     How would the patient like to obtain the AVS?  Martha    As the provider I attest to compliance with applicable laws and regulations related to telemedicine.    OUTPATIENT PSYCHOTHERAPY PROGRESS NOTE    Client Name: Barrett Bazan   YOB: 2000 (22 year old)   Date of Service:  Dec 27, 2022  Time of Service: 5:10 pm to 5:55 pm (45 minutes)  Service Type(s):43824 psychotherapy     Diagnoses:   Encounter Diagnosis   Name Primary?     Depression, major, in remission (H) Yes       Individuals Present:   Client attended alone    Treatment goal(s) being addressed:  1) Reassurance and reducing panic attacks  2) Improving stress  management capacity at school    Subjective:  Barrett called in from his flat on campus. He had just had an anxiety attack where he had somatic presentations like numbing and a 5/10 pain that lasted for about an hour. He took his PRN Ativan and appeared to be slightly drowsy. Conversation revolved around the triggers or there lack thereof, worries and ways to cope. Writer used reassurance, positive encouragement and appraising the effective coping mechanisms. He was recently went up on his Venlafaxine and hoped that this increase is going to have positive effects on the panic attacks.    Barrett mentioned that today is the first day of school and it has been a busy and tiresome experience for him. He was able to express his gratitude for moving to a private flat where he has more privacy and would be able to focus on himself.      Treatment:   Supportive therapy     Assessment and Progress:   Barrett is a 22 year-old with history of depression and anxiety who is referred by psychiatrist for supportive psychotherapy with the working diagnosis of panic disorder. Barrett meets with therapist on a weekly basis to continue working on the treatment goal.    Plan:   Next therapy appointment has been scheduled for next week to continue working on treatment goals.    Treatment Plan          SYMPTOMS; PROBLEMS   MEASURABLE GOALS;    FUNCTIONAL IMPROVEMENT INTERVENTIONS;   GAINS MADE DISCHARGE CRITERIA   Depression: depressed mood, appetite changes and concentration problems  Panic Attacks: dizziness, choking sensation, racing heart and fear  Psychosocial: limited social support   reduce depressive symptoms, reduce panic attacks/ excessive worry and take steps to improve support network acceptance of limitations/reality  psycho-education   psychotherapy  relaxation techniques   self-care skills  strength focus marked symptom improvement and reduced visit frequency     Date of most recent treatment plan: 12/27/22  Date next  treatment plan due: 3 months    Patient did not report any changes to medications    Psychotherapy services during this visit included myself and the patient. Patient agreed with the treatment plan. Discussed case with supervisor who also agreed with the treatment plan. Unable to sign in person due to visit being conducted through telehealth.     Aguilar Mccracken MD  PGY2 Psychiatry    Case discussed with attending therapist who was not present in person.

## 2023-01-30 ENCOUNTER — VIRTUAL VISIT (OUTPATIENT)
Dept: PSYCHIATRY | Facility: CLINIC | Age: 23
End: 2023-01-30
Attending: NURSE PRACTITIONER
Payer: COMMERCIAL

## 2023-01-30 DIAGNOSIS — F32.5 DEPRESSION, MAJOR, IN REMISSION (H): Primary | ICD-10-CM

## 2023-01-30 DIAGNOSIS — F41.1 GAD (GENERALIZED ANXIETY DISORDER): ICD-10-CM

## 2023-01-30 PROCEDURE — 99215 OFFICE O/P EST HI 40 MIN: CPT | Mod: 95 | Performed by: NURSE PRACTITIONER

## 2023-01-30 RX ORDER — VENLAFAXINE HYDROCHLORIDE 150 MG/1
150 CAPSULE, EXTENDED RELEASE ORAL DAILY
Qty: 90 CAPSULE | Refills: 0 | Status: SHIPPED | OUTPATIENT
Start: 2023-01-30 | End: 2023-04-19

## 2023-01-30 RX ORDER — VENLAFAXINE HYDROCHLORIDE 150 MG/1
150 CAPSULE, EXTENDED RELEASE ORAL
COMMUNITY
Start: 2023-01-03 | End: 2023-01-30

## 2023-01-30 NOTE — PROGRESS NOTES
"VIDEO VISIT  Barrett Bazan is a 22 year old who is being evaluated via a billable video visit.      Telehealth Details  Type of service:  medication management  Time of service:    Start Time:  10:16 AM     End Time: 11:08a    Reason for Telehealth Visit: Patient has requested telehealth visit  Originating Site (patient location):  [facility]   Location- Patient's home  Distant Site (provider location):  Off-site  Mode of Communication:  [facility]  Psychiatry Clinic  PSYCHIATRIC PROGRESS NOTE       Barrett Bazan is a 22 year old male who prefers the name Barrett and pronouns he, him.  Therapist: established with Dr. Nawaf Abbott  PCP: Eileen Andrew  Other Providers: followed by Dr Andrew in [place] as PCP    PREVIOUS PSYCH MED TRIALS:  - Lexapro 20mg (trialed first)  - Vyvanse 20mg (activated anxiety)  - olanzapine zydis 5mg (given in 10/2021 ER note for marijuana abuse)  - Zoloft, Adderall, hydroxyzine trials referred to in chart    Pertinent Background:  See previous notes.  Psych critical item history includes one week admit in [place] in 2019, history of active SI with plan to shoot himself in 2019,     Interim History     [4, 4]     The patient is a fair historian and reports good treatment adherence.    He presents with Mom Maddy secondary to his PCP suggestion after he notes a tendency to \"paint a better picture than what it really is. We need to go back because I'm often feeling great in the moment\".     Mom adds it is important to have regular psychiatry follow up and need for CBT, they'll discuss with Dr. Mccracken.    Information gathered from patient report and chart review.    Assessed in ER in Oct 2021 for marijuana abuse, in Feb 2022 for palpitations (unremarkable EKG, 450 QTc), in June 2022 for anxiety.    Reviewed Dr. Naylor's note from     Currently taking venlafaxine 150mg QAM (increased to current dose in Dec 2022, describes discontinuation symptoms if he takes " "the dose later than 24 hours- twitchiness, brain zaps), lorazepam 0.5mg daily PRN (PCP in [place] was writing, he's aware of risks of med class, use reduced about 2x month).     No medical meds.    Since the last visit, he's been \"on average, a average of 8\".  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  - he's taking 18 credits at present, doing well in his classes and motivated to do better, he's behind in his courses, waiting to hear if he gets into [employer] for finance major in his senior year  - enjoys friends, going out to the bar, exercise  - support from his Mom and therapist, he often prefers to keep things private from friends  - coping skills are improving, he'd rate his skills as an 8 of 10 if 10 is best, he uses cold water and ice to his face, breathing, distracting, support seeking, talking to friends, drinking water, eating, journaling    Recent Symptoms:   Depression: improved with venlafaxine increase over two weeks, he'd rate depression now as a 3 or 4 of 10 if 10 is worst; noting a heavy head and \"a weight on me\" appears to be resolving with venlafaxine, symptoms include mood swings, might wake up \"not feeling like myself\"; endorses feeling \"down\", denies anhedonia and endorses an ability to \"switch up my mood\"; denies change in sleep, Mom notes reduced appetite with venlafaxine, denies SI in the last four years    Elevated:  he denies decreased sleep need, grandiosity, reckless behaviors  Psychosis:  none with while euthymic or depressed     Anxiety: transitions and stress increase anxiety, finals, feeling trapped in a long car ride, visiting extended family or somewhere new is hard on his body- physical symptoms surface first (muscle pain, tension, edgy, body goes numb, dyspnea, dizzy, lightheaded); emotional anxiety symptoms vary and \"depend on how much I believe I'm going to die\"; chronic anxiety reduced with " "venlafaxine  - Mom Is treated for OCD, she observes obsessive thought pattern in Barrett; per chart, history of \"obsessive negative thoughts\"    Panic Attack: with therapy and venlafaxine, he denies  Trauma Related:  none   Inattention: previously diagnosed with ADHD, he stopped Vyvanse 10-20mg due to activation    ADVERSE EFFECTS: weight loss, discontinuation symptoms, nausea  MEDICAL CONCERNS: recovering from a snow boarding accident in 2022 with increase in anxiety symptoms    APPETITE: eats more and healthier when at parent's home, low appetite at [employer] and lower availability of desired food in/ near [facility]; lower appetite on venlafaxine, estimates he's lost to 165-170#; 175# in Feb 2022, more takeout on weekends    SLEEP: most nights, he falls asleep in 10 minutes, stays asleep all night, usually gets 6-7 hours during the week; needs 7 hours to wake rested; might nap for 2-3 hours 2x weekly, sleeps 12 hours on weekend     Recent Substance Use:  Alcohol- drinks socially, drinks \"enough to get me drunk, maybe 1-2x weekly, prefers beer and mixed drinks, seltzer; does not drink to pass out or black out   Tobacco- no   Caffeine- 1-2 cups coffee daily, dislikes soda and energy drinks   Opioids- no Narcan Kit- N/A   Cannabis- no   Other Illicit Drugs-none        Social/ Family History      [1ea,1ea]            [per patient report]                 FINANCIAL SUPPORT- college student at [employer]       CHILDREN- None       LIVING SITUATION- lives in his own room in the [facility]   LEGAL- None  EARLY HISTORY/ EDUCATION- born and raised in [place], born to  parents. Sister Dalia (b. 2004). Current [employer] student. He spends summers at home in [place].  SOCIAL/ SPIRITUAL SUPPORT- social support from Mom, therapist; identifies as not Alevism       CULTURAL INFLUENCES/ IMPACT- none       TRAUMA HISTORY- Mom perceives snow boarding accident in 2022 as medical trauma  FEELS SAFE AT HOME- Yes   FAMILY PSYCH HISTORY: diffuse depression on " maternal side; Mom- treated for OCD with fluvoxamine effectively, MGM- depression, MGF-  by suicide (shooting)    Medical / Surgical History                                 Patient Active Problem List   Diagnosis     Depression, major, in remission (H)       No past surgical history on file.     Medical Review of Systems         [2,10]     A comprehensive review of systems was performed and is negative other than noted in the HPI.    Snowboarding accident in  (torn labrum); denies TBI, LOC. Denies seizures. Reviewed drug allergies.    Allergy    Amoxicillin-pot clavulanate, Oxycodone, and Doxycycline  Current Medications        Current Outpatient Medications   Medication Sig Dispense Refill     venlafaxine (EFFEXOR XR) 150 MG 24 hr capsule Take 150 mg by mouth       escitalopram (LEXAPRO) 20 MG tablet Take 20 mg by mouth daily       Vitals         [3, 3]   There were no vitals taken for this visit.   Mental Status Exam        [9, 14 cog gs]     Alertness: alert  and oriented  Appearance: adequately groomed  Behavior/Demeanor: cooperative, pleasant and calm, with good  eye contact   Speech: normal and regular rate and rhythm  Language: no problems  Psychomotor: normal or unremarkable  Mood: recently stable  Affect: restricted and appropriate; was congruent to mood; was congruent to content  Thought Process/Associations: unremarkable  Thought Content:  Reports none;  Denies suicidal and violent ideation, delusions, preoccupations, obsessions , phobia , magical thinking, over-valued ideas and paranoid ideation  Perception:  Reports none;  Denies auditory hallucinations, visual hallucinations, visual distortion seen as shadows , depersonalization and derealization  Insight: fair  Judgment: adequate for safety  Cognition:does  appear grossly intact; formal cognitive testing was not done  Gait/Station and/or Muscle Strength/Tone: N/A    Labs and Data                          Rating Scales:      PHQ9 Today:    No  flowsheet data found.    Diagnosis      Impression includes recurrent, moderate MDD in partial remission and GAVIN responding to meds and therapy  - per patient, previous diagnosis of ADHD is managed with behavioral skills as stimulant was activating  - per Mom, medical trauma from snowboarding accident in 2022     Assessment      [m2, h3]     TODAY, the following items were reviewed:    [place] Pres[place] Monitoring [place] [] was checked today in [place] and [place]:  indicates Vyvanse 10mg #30 last filled in 10/21/2022, lorazepam 0.5mg #30 last filled 8/22/2022, clonazepam ODT 0.25mg #30 last filled 6/14/2022, Vyvanse 20mg #30 last filled 2/25/2022.    PSYCHOTROPIC DRUG INTERACTIONS: none with Effexor, Ativan    Drug Interaction Management: Monitoring for adverse effects, routine vitals, using lowest therapeutic dose of [psychotropics] and patient is aware of risks    Plan                                                                                                                     m2, h3     1) discussed options, risks, benefits including utility of Pristiq and Propranolol, importance of taking venlafaxine consistently, active skill building in therapy and enacting coping strategies through the week, provided psychoeducation on risks of BZD and in context of use at 21yo, need to be forthcoming with bad and good days/ symptoms, seeing one provider for psychiatry, discussing therapy modality with Dr. Mccracken:    - today, he and Mom choose to continue venlafaxine 150mg QAM (needs, submitted for 90 d/s) and lorazepam 0.5mg daily PRN (has- #10)  - will review importance of limiting substances and need for sobriety for meds to be more effective.    2) monitoring ASE: nausea, discontinuation (twitching, brain zaps), importance of consistency in daily meds, appetite and weight loss    3) established in therapy with Dr. Nawaf Abbott    RTC: 4 weeks, sooner as needed    CRISIS NUMBERS:   Provided routinely in AVS.    Treatment  Risk Statement:  The patient understands the risks, benefits, adverse effects and alternatives. Agrees to treatment with the capacity to do so. No medical contraindications to treatment. Agrees to call clinic for any problems. The patient understands to call 911 or go to the nearest ED if life threatening or urgent symptoms occur.     WHODAS 2.0  TODAY total score = N/A; [a 12-item WHODAS 2.0 assessment was not completed by the pt today and/or recorded in EPIC].    PROVIDER:  JENNIFER Alvarez CNP

## 2023-01-30 NOTE — PATIENT INSTRUCTIONS
**For crisis resources, please see the information at the end of this document**   Patient Education    Thank you for coming to the [facility] [place] [facility].     Lab Testing:  If you had lab testing today and your results are reassuring or normal they will be mailed to you or sent through [employer] within 7 days. If the lab tests need quick action we will call you with the results. The phone number we will call with results is # [phone]. If this is not the best number please call our clinic and change the number.     Medication Refills:  If you need any refills please call your pharmacy and they will contact us. Our fax number for refills is [phone].   Three business days of notice are needed for general medication refill requests.   Five business days of notice are needed for controlled substance refill requests.   If you need to change to a different pharmacy, please contact the new pharmacy directly. The new pharmacy will help you get your medications transferred.     Contact Us:  Please call [phone] during business hours (8-5:00 M-F).   If you have medication related questions after clinic hours, or on the weekend, please call [phone].     Financial Assistance [phone]   Medical Records [phone]       MENTAL HEALTH CRISIS RESOURCES:  For a emergency help, please call 911 or go to the nearest Emergency Department.     Emergency Walk-In Options:   EmPATH Unit @ [facility] ([place]): [phone] - Specialized mental health emergency area designed to be calming  [facility] ([place]): [phone]  [facility] ([place]): [phone]  [facility]): [phone]    [facility] Crisis Information:   [place]: [phone]  [place]: [phone]  [place] ([place]) - Adult: [phone]     Child: [phone]  [place] - Adult: [phone]     Child: [phone]  [place]:  [phone]  List of all [facility] resources:   https://[place].gov/dhs/people-we-serve/adults/health-care/mental-health/resources/crisis-contacts.jsp    National Crisis Information:   Crisis Text Line: Text  [place]  to 658266  Suicide & Crisis Lifeline: 988  National Suicide Prevention Lifeline: 7-740-523-TALK (1-576.576.2127)       For online chat options, visit https://suicidepreventionlifeline.org/chat/  Poison Control Center: 6-779-274-3295  Trans Lifeline: 0-318-030-9880 - Hotline for transgender people of all ages  The [employer]: 0-059-531-3676 - Hotline for LGBT youth     For Non-Emergency Support:   Fast Tracker: Mental Health & Substance Use Disorder Resources -   https://www.[employer]ck[employer]Flowyn.org/

## 2023-01-31 ENCOUNTER — VIRTUAL VISIT (OUTPATIENT)
Dept: PSYCHIATRY | Facility: CLINIC | Age: 23
End: 2023-01-31
Attending: PSYCHOLOGIST
Payer: COMMERCIAL

## 2023-01-31 DIAGNOSIS — F32.5 DEPRESSION, MAJOR, IN REMISSION (H): Primary | ICD-10-CM

## 2023-01-31 PROCEDURE — 90834 PSYTX W PT 45 MINUTES: CPT | Mod: VID

## 2023-01-31 NOTE — PROGRESS NOTES
Video- Visit Details  Type of service:  video visit for mental health treatment.  Time of service:    Date:  12/27/2022    Video Start Time:  3:01 PM        Video End Time: 3:46 PM    Reason for video visit: Client preference  Originating Site (patient location): [facility]  Distant Site (provider location):  Psychiatry clinic  Mode of Communication:  Video Conference via [employer]      Consent: This virtual video visit will be conducted via a video call between the patient and his physician. We have found that certain health care needs can be provided without the need for a physical exam. This service lets us provide the care the patient needs with a short phone conversation.  If a prescription is necessary we can send it directly to the patient's pharmacy.  If lab work is needed we can place an order for that and the patient can then stop by our lab to have the test done at a later time. If during the course of the video call the physician/provider feels a video visit is not appropriate, the patient will not be charged for this service. The patient has verbally consented to: the potential risks and benefits of telemedicine (video) versus in person care; bill my insurance or make self-payment for services provided; and responsibility for payment of non-covered services.     How would the patient like to obtain the AVS?  Martha    As the provider I attest to compliance with applicable laws and regulations related to telemedicine.    OUTPATIENT PSYCHOTHERAPY PROGRESS NOTE    Client Name: Barrett Bazan   YOB: 2000 (22 year old)   Date of Service:  Dec 27, 2022  Time of Service: 3:01 pm to 3:46 pm (45 minutes)  Service Type(s):01230 psychotherapy (45 min. with patient)    Diagnoses:   Encounter Diagnosis   Name Primary?     Depression, major, in remission (H) Yes       Individuals Present:   Client attended alone    Treatment goal(s) being addressed:  1) Reassurance and reducing panic  "attacks  2) Increase engagement in activities that can improve mood    Subjective:  Barrett joined the video meeting from his room at the [facility]. He was coming down with the cold. He was happy with the psychiatry visit he had yesterday and mentioned that the there has been no changes to the regimen now. He submitted application for CBT and is looking forward to starting off soon. Depression has been better since last week. He has noticed a pattern of a few \"fine weeks\" followed by a week of \"not severe depression\". He denied having any SI/SIB/HI/AVH and felt safe being alone at the [facility]. Anxiety has been less of an issue however if he consumes alcoholic drink he would feel more anxious the next day but mentions has been manageable. He was encouraged to maximize opportunities to spend more time with friends and do activities that brings him omar. 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 We discussed family dynamics at home and he ws grateful for the stable and supportive family life. Validation, positive reinforcement and reassurance was provided in this session.    Treatment:   Supportive therapy     Assessment and Progress:   Barrett is a 22 year-old with history of depression and anxiety who is referred by psychiatrist for supportive psychotherapy with the working diagnosis of depressive disorder in remission. Barrett meets with therapist on a weekly basis to continue working on the treatment goals.    Plan:   Next therapy appointment has been scheduled in one week to continue work on treatment goals.    Treatment Plan          SYMPTOMS; PROBLEMS   MEASURABLE GOALS;    FUNCTIONAL IMPROVEMENT INTERVENTIONS;   GAINS MADE DISCHARGE CRITERIA   Depression: depressed mood, appetite changes and concentration problems  Panic Attacks: dizziness, choking sensation, racing heart and fear  Psychosocial: limited social support   reduce " depressive symptoms, reduce panic attacks/ excessive worry and take steps to improve support network acceptance of limitations/reality  psycho-education   psychotherapy  relaxation techniques   self-care skills  strength focus marked symptom improvement and reduced visit frequency     Date of most recent treatment plan: 12/27/22  Date next treatment plan due: 3 months    Patient did not report any changes to medications    Psychotherapy services during this visit included myself and the patient. Patient agreed with treatment plan. Discussed case with supervisor who also agreed with the treatment plan. Unable to sign in person due to visit being conducted through telehealth.     Aguilar Mccracken MD  PGY2 Psychiatry    Case discussed with attending therapist who was not present in person.

## 2023-02-07 ENCOUNTER — VIRTUAL VISIT (OUTPATIENT)
Dept: PSYCHIATRY | Facility: CLINIC | Age: 23
End: 2023-02-07
Attending: PSYCHOLOGIST
Payer: COMMERCIAL

## 2023-02-07 DIAGNOSIS — F32.5 DEPRESSION, MAJOR, IN REMISSION (H): Primary | ICD-10-CM

## 2023-02-07 PROCEDURE — 90837 PSYTX W PT 60 MINUTES: CPT | Mod: VID

## 2023-02-07 NOTE — PROGRESS NOTES
Video- Visit Details  Type of service:  video visit for mental health treatment.  Time of service:    Date:  2/7/2022    Video Start Time:  3:03 PM        Video End Time: 4:03 PM    Reason for video visit: Client preference  Originating Site (patient location): Patient's room at [facility]  Distant Site (provider location):  Home office  Mode of Communication:  Video Conference via [employer]      Consent: This virtual video visit will be conducted via a video call between the patient and his physician. We have found that certain health care needs can be provided without the need for a physical exam. This service lets us provide the care the patient needs with a short phone conversation.  If a prescription is necessary we can send it directly to the patient's pharmacy.  If lab work is needed we can place an order for that and the patient can then stop by our lab to have the test done at a later time. If during the course of the video call the physician/provider feels a video visit is not appropriate, the patient will not be charged for this service. The patient has verbally consented to: the potential risks and benefits of telemedicine (video) versus in person care; bill my insurance or make self-payment for services provided; and responsibility for payment of non-covered services.     How would the patient like to obtain the AVS?  Martha    As the provider I attest to compliance with applicable laws and regulations related to telemedicine.    OUTPATIENT PSYCHOTHERAPY PROGRESS NOTE    Client Name: Barrett Bazan   YOB: 2000 (22 year old)   Date of Service:  Feb 7, 2023  Time of Service: 3:03 pm to 4:03 pm (60 minutes)  Service Type(s):25695 psychotherapy (60 min. with patient)    Diagnoses:   Encounter Diagnosis   Name Primary?     Depression, major, in remission (H) Yes       Individuals Present:   Client attended alone    Treatment goal(s) being addressed:  1) Reassurance and reducing panic  "attacks  2) Increase engagement in activities that can improve mood    Subjective:  Barrett connected the call from his room at the [facility]. He appeared bright and pleasant. He stated he had had a \"pretty good day\" so far. He rated his depression 0/10. He also mentioned that lately the pattern of his mood has changed. Historically his depressed moods would last a day or two before gradually improving however he has been noticing that the \"downs\" last only a few hours since last week. He attributed it to medications taking effect and also change in daylight. He denied having any SI/SIB/HI/AVH and felt safe being alone at the [facility].He continued to deny anxiety attacks and overall found anxiety \"under control\". Writer discussed the daily routine during the weekdays and weekends to understand how Barrett spends his time. Barrett was encouraged to diversify the activites that bring him omar and experience new hobbies or activities over the weekend manageable. Barrett also explained the difficulties he has with maintaining romantic relationships and how he chooses to avoid them as a solution. Validation, positive reinforcement and reassurance was provided in this session.    Treatment:   Supportive therapy     Assessment and Progress:   Barrett is a 22 year-old with history of depression and anxiety who is referred by psychiatrist for supportive psychotherapy with the working diagnosis of depressive disorder in remission. Barrett meets with therapist on a weekly basis to continue working on the treatment goals.    Plan:   Next therapy appointment has been scheduled in one week to continue work on treatment goals.    Treatment Plan          SYMPTOMS; PROBLEMS   MEASURABLE GOALS;    FUNCTIONAL IMPROVEMENT INTERVENTIONS;   GAINS MADE DISCHARGE CRITERIA   Depression: depressed mood, appetite changes, weight changes and concentration problems  Psychosocial: relationship stress   find enjoyment at least once a day and report " feeling more positive about self  acceptance of limitations/reality  psycho-education   psychotherapy  relaxation techniques   self-care skills  strength focus marked symptom improvement and reduced visit frequency     Date of most recent treatment plan: 2/7/2023  Date next treatment plan due: 3 months    Patient did not report any changes to medications    Psychotherapy services during this visit included myself and the patient. Patient agreed with treatment plan. Discussed case with supervisor who also agreed with the treatment plan. Unable to sign in person due to visit being conducted through telehealth.     Aguilar Mccracken MD  PGY2 Psychiatry    Case discussed with attending therapist who was not present in person.

## 2023-02-14 ENCOUNTER — VIRTUAL VISIT (OUTPATIENT)
Dept: PSYCHIATRY | Facility: CLINIC | Age: 23
End: 2023-02-14
Attending: PSYCHOLOGIST
Payer: COMMERCIAL

## 2023-02-14 DIAGNOSIS — F32.5 DEPRESSION, MAJOR, IN REMISSION (H): Primary | ICD-10-CM

## 2023-02-14 PROCEDURE — 90837 PSYTX W PT 60 MINUTES: CPT | Mod: VID

## 2023-02-21 ENCOUNTER — VIRTUAL VISIT (OUTPATIENT)
Dept: PSYCHIATRY | Facility: CLINIC | Age: 23
End: 2023-02-21
Attending: PSYCHOLOGIST
Payer: COMMERCIAL

## 2023-02-21 DIAGNOSIS — F32.5 DEPRESSION, MAJOR, IN REMISSION (H): Primary | ICD-10-CM

## 2023-02-21 PROCEDURE — 90834 PSYTX W PT 45 MINUTES: CPT | Mod: VID

## 2023-02-22 NOTE — PROGRESS NOTES
Video- Visit Details  Type of service:  video visit for mental health treatment.  Time of service:    Date:  2/21/2022    Video Start Time:  3:07 PM        Video End Time: 4:52 PM    Reason for video visit: Client preference  Originating Site (patient location): Patient's room at [facility]  Distant Site (provider location):  [facility]  Mode of Communication:  Video Conference via AmWell      Consent: This virtual video visit will be conducted via a video call between the client and his therapist. This service lets us provide the care the patient needs with a video conversation. If during the course of the video call the provider feels a video visit is not appropriate, the patient will not be charged for this service. The patient has verbally consented to: the potential risks and benefits of telemedicine (video) versus in person care; bill my insurance or make self-payment for services provided; and responsibility for payment of non-covered services.     How would the patient like to obtain the AVS?  Martha    As the provider I attest to compliance with applicable laws and regulations related to telemedicine.    OUTPATIENT PSYCHOTHERAPY PROGRESS NOTE    Client Name: Barrett Bazan   YOB: 2000 (22 year old)   Date of Service:  Feb 21, 2023  Time of Service: 3:07 pm to 4:52 pm (45 minutes)  Service Type(s):416926 psychotherapy (645 min. with patient)    Diagnoses:   Encounter Diagnosis   Name Primary?     Depression, major, in remission (H) Yes       Individuals Present:   Client attended alone    Treatment goal(s) being addressed:  1) Decreasing the frequency and intensity of anxiety attacks  2) Improve confidence, emotional intelligence and mental strength    Subjective:  Barrett connected the call from his room at the [facility]. He appeared bright and in no distress. He talked about last weekend visiting family in [place]. The relationship and dynamics regarding parents were  discussed. He mentioned having moderate anxiety attacks on the train going and returning. The movement and road lights at night were identified as triggers. He denied having any other events of anxiety since last visit. He rated his mood an 8-9/10.  He denied having any SI/SIB/HI/AVH and felt safe being alone at the [facility] house. The habit of journaling and its role in identifying the barriers in way of greater confidence were discussed. He pointed out he would benefit from CBT to implement the cognitive gains in real-life behaviors. At the end of the session Barrett was able to identify and acknowledge three things he is grateful for. Validation, positive reinforcement and reassurance was provided in this session.    Treatment:   Supportive therapy     Assessment and Progress:   Barrett is a 22 year-old with history of depression and anxiety who is referred by psychiatrist for supportive psychotherapy with the working diagnosis of depressive disorder in remission. Barrett meets with therapist on a weekly basis to continue working on the treatment goals.    Plan:   Next therapy appointment has been scheduled in one week to continue work on treatment goals.    Treatment Plan          SYMPTOMS; PROBLEMS   MEASURABLE GOALS;    FUNCTIONAL IMPROVEMENT INTERVENTIONS;   GAINS MADE DISCHARGE CRITERIA   Depression: depressed mood  Psychosocial: mental health symptoms and relationship stress   reduce depressive symptoms, find enjoyment at least once a day, report feeling more positive about self  and increase time spent with others acceptance of limitations/reality  psycho-education   psychotherapy  relaxation techniques   self-care skills  strength focus marked symptom improvement and reduced visit frequency     Date of most recent treatment plan: 2/7/2023  Date next treatment plan due: 3 months    Patient did not report any changes to medications    Psychotherapy services during this visit included myself and the patient.  Patient agreed with treatment plan. Discussed case with supervisor who also agreed with the treatment plan. Unable to sign in person due to visit being conducted through telehealth.     Aguilar Mccracken MD  PGY2 Psychiatry    Case discussed with attending therapist who was not present in person.

## 2023-02-22 NOTE — PROGRESS NOTES
Video- Visit Details  Type of service:  video visit for mental health treatment.  Time of service:    Date:  2/21/2022    Video Start Time:  3:05 PM        Video End Time: 3:55 PM    Reason for video visit: Client preference  Originating Site (patient location): Patient's room at [facility]  Distant Site (provider location):  [facility]  Mode of Communication:  Video Conference via AmWell      Consent: This virtual video visit will be conducted via a video call between the client and his therapist. This service lets us provide the care the patient needs with a video conversation. If during the course of the video call the provider feels a video visit is not appropriate, the patient will not be charged for this service. The patient has verbally consented to: the potential risks and benefits of telemedicine (video) versus in person care; bill my insurance or make self-payment for services provided; and responsibility for payment of non-covered services.     How would the patient like to obtain the AVS?  Martha    As the provider I attest to compliance with applicable laws and regulations related to telemedicine.    OUTPATIENT PSYCHOTHERAPY PROGRESS NOTE    Client Name: Barrett Bazan   YOB: 2000 (22 year old)   Date of Service:  Feb 14, 2023  Time of Service: 3:05 pm to 3:55 pm (45 minutes)  Service Type(s):647831 psychotherapy (645 min. with patient)    Diagnoses:   Encounter Diagnosis   Name Primary?     Depression, major, in remission (H) Yes       Individuals Present:   Client attended alone    Treatment goal(s) being addressed:  1) Decreasing the frequency and intensity of anxiety attacks  2) Improve confidence, emotional intelligence and mental strength    Subjective:  Barrett connected the call from his room at the [facility]. He started the session off by saying he had a rough week. Specifically d/t bothersome brain zaps from the increased dose of the medication he is on. He  also stated the anxiety has been worse and was able to point out consuming alcohol making it more noticeable and intense. We discussed the negative thoughts regarding bodily sensations and physical health occupying his mind when he is not actively engaged in a task or outside. He identified being distracted and occupied as the main protective factors in blocking the negative thoughts. He stated he has been using Trazodone which was previously prescribed to make him fall asleep faster if he found trouble in initiating sleep. He denied depression and rated his mood a 6/10.  He denied having any SI/SIB/HI/AVH and felt safe being alone at the [facility]. At the end of the session Barrett acknowledged his family and the new air purifier as things he was grateful for. Validation, positive reinforcement and reassurance was provided in this session.    Treatment:   Supportive therapy     Assessment and Progress:   Barrett is a 22 year-old with history of depression and anxiety who is referred by psychiatrist for supportive psychotherapy with the working diagnosis of depressive disorder in remission. Barrett meets with therapist on a weekly basis to continue working on the treatment goals.    Plan:   Next therapy appointment has been scheduled in one week to continue work on treatment goals.    Treatment Plan          SYMPTOMS; PROBLEMS   MEASURABLE GOALS;    FUNCTIONAL IMPROVEMENT INTERVENTIONS;   GAINS MADE DISCHARGE CRITERIA   Depression: depressed mood  Psychosocial: mental health symptoms and relationship stress   reduce depressive symptoms, find enjoyment at least once a day, report feeling more positive about self  and increase time spent with others acceptance of limitations/reality  psycho-education   psychotherapy  relaxation techniques   self-care skills  strength focus marked symptom improvement and reduced visit frequency     Date of most recent treatment plan: 2/7/2023  Date next treatment plan due: 3  months    Patient did not report any changes to medications    Psychotherapy services during this visit included myself and the patient. Patient agreed with treatment plan. Discussed case with supervisor who also agreed with the treatment plan. Unable to sign in person due to visit being conducted through telehealth.     Aguilar Mccracken MD  PGY2 Psychiatry    Case discussed with attending therapist who was not present in person.

## 2023-02-24 ENCOUNTER — VIRTUAL VISIT (OUTPATIENT)
Dept: PSYCHIATRY | Facility: CLINIC | Age: 23
End: 2023-02-24
Attending: NURSE PRACTITIONER
Payer: COMMERCIAL

## 2023-02-24 DIAGNOSIS — F41.1 GAD (GENERALIZED ANXIETY DISORDER): Primary | ICD-10-CM

## 2023-02-24 PROCEDURE — 99214 OFFICE O/P EST MOD 30 MIN: CPT | Mod: 95 | Performed by: NURSE PRACTITIONER

## 2023-02-24 RX ORDER — LORAZEPAM 0.5 MG/1
TABLET ORAL
Qty: 5 TABLET | Refills: 0 | Status: SHIPPED | OUTPATIENT
Start: 2023-02-24

## 2023-02-24 ASSESSMENT — PATIENT HEALTH QUESTIONNAIRE - PHQ9
SUM OF ALL RESPONSES TO PHQ QUESTIONS 1-9: 3
10. IF YOU CHECKED OFF ANY PROBLEMS, HOW DIFFICULT HAVE THESE PROBLEMS MADE IT FOR YOU TO DO YOUR WORK, TAKE CARE OF THINGS AT HOME, OR GET ALONG WITH OTHER PEOPLE: SOMEWHAT DIFFICULT
SUM OF ALL RESPONSES TO PHQ QUESTIONS 1-9: 3

## 2023-02-24 NOTE — PROGRESS NOTES
"VIDEO VISIT  Barrett Bazan is a 22 year old who is being evaluated via a billable video visit.      Telehealth Details  Type of service:  medication management  Time of service:    Start Time:  3:07p    End Time: 3:34p    Reason for Telehealth Visit: Patient has requested telehealth visit  Originating Site (patient location):  [facility]   Location- Patient's home  Distant Site (provider location):  Off-site  Mode of Communication:  AmWell     Switched to phone when we could not connect on Amwell.       [facility]  Psychiatry Clinic  PSYCHIATRIC PROGRESS NOTE       Barrett Bazan is a 22 year old male who prefers the name Barrett and pronouns he, him.  Therapist: established with Dr. Nawaf Abbott  PCP: Eileen Andrew  Other Providers: followed by Dr Andrew in [place] as PCP    PREVIOUS PSYCH MED TRIALS:  - Lexapro 20mg (trialed first)  - Vyvanse 20mg (activated anxiety)  - olanzapine zydis 5mg (given in 10/2021 ER note for marijuana abuse)  - trazodone 50mg  - Zoloft, Adderall, hydroxyzine trials referred to in chart    Pertinent Background:  See previous notes.  Psych critical item history includes one week admit in [place] in 2019, history of active SI with plan to shoot himself in 2019,     Interim History     [4, 4]     The patient is a fair historian and reports good treatment adherence.    Last seen on 1/30/2023 when he chose to continue venlafaxine 150mg QAM (increased to current dose in 12/2023, risk for discontinuation increases with late doses), and lorazepam 0.5mg daily PRN (limits to 2x month, aware of risks).    Assessed in ER in Oct 2021 for marijuana abuse, in Feb 2022 for palpitations (unremarkable EKG, 450 QTc), in June 2022 for anxiety.    He presents alone, his Mom was not present.    Since the last visit, he's been \"a solid 7.5 of 10 in the last month\".  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  - he's taking 18 credits at present, not behind, he's active in his homework, reading, classes  - thinks he'll hear between March 18-Apr 1 if he's accepted to [employer] for finance major in his senior year  - enjoys friends, going out to the bar, exercise  - support from his Mom and therapist, he often prefers to keep things private from friends  - coping skills are improving, needs to use are reducing, #1 is calling his Mom but trying to use paced breathing then applies cold presses to his face, breathing, distracting, support seeking, talking to friends, drinking water, eating, journaling    Recent Symptoms:   Depression: improved with venlafaxine increase over two weeks, he'd rate depression now as a 3 or 4 of 10 if 10 is worst; noting a heavy head and \"a weight on me\" appears to be resolving with venlafaxine, symptoms include mood swings, might wake up \"not feeling like myself\"; endorses feeling \"down\", denies anhedonia and endorses an ability to \"switch up my mood\"; denies change in sleep, Mom notes reduced appetite with venlafaxine, denies SI in the last four years    Anxiety: \"it's better. The most difficult is a simmering anxiety. It's easier to talk yourself out of the sudden anxiety\".   - transitions and stress increase anxiety, finals, feeling trapped in a long car ride, visiting extended family or somewhere new is hard  - Mom Is treated for OCD, she observes obsessive thought pattern in Barrett; per chart, history of \"obsessive negative thoughts\"    ADVERSE EFFECTS: none  MEDICAL CONCERNS: none today    APPETITE: improved and eating more consistently, estimates he weighs close to 170#; 175# in Feb 2022    SLEEP: takes " "trazodone 50mg to fall asleep some weeks 3x weekly then none for a month; sleeping 7 hours during the week, 10-11 hours on weekend     Recent Substance Use:  Alcohol- aware anxiety increases when he's been drinking or hung over, limited to drinking socially 1-2x weekly, gets \"social drunk. It's excessive. 8-10 beers a night if I'm having a night like that. Much more casual the second night if I drink\". prefers beer and mixed drinks, seltzer    Caffeine- 1-2 cups coffee daily, dislikes soda and energy drinks         Social/ Family History      [1ea,1ea]            [per patient report]                 FINANCIAL SUPPORT- college student at [employer]       CHILDREN- None       LIVING SITUATION- [facility]   LEGAL- None  EARLY HISTORY/ EDUCATION- born and raised in [place], born to  parents. Sister Dalia (b. ). Current [employer] student. He spends summers at home in [place].  SOCIAL/ SPIRITUAL SUPPORT- social support from Mom, therapist; identifies as not Worship       CULTURAL INFLUENCES/ IMPACT- none       TRAUMA HISTORY- Mom perceives snow boarding accident in  as medical trauma  FEELS SAFE AT HOME- Yes   FAMILY PSYCH HISTORY: diffuse depression on maternal side; Mom- treated for OCD with fluvoxamine effectively, MGM- depression, MGF-  by suicide (shooting)    Medical / Surgical History                                 Patient Active Problem List   Diagnosis     Depression, major, in remission (H)       No past surgical history on file.     Medical Review of Systems         [2,10]     A comprehensive review of systems was performed and is negative other than noted in the HPI.    Snowboarding accident in  (torn labrum); denies TBI, LOC. Denies seizures.     Allergy    Amoxicillin-pot clavulanate, Oxycodone, and Doxycycline  Current Medications        Current Outpatient Medications   Medication Sig Dispense Refill     venlafaxine (EFFEXOR XR) 150 MG 24 hr capsule Take 1 capsule (150 mg) by " mouth daily 90 capsule 0     Vitals         [3, 3]   There were no vitals taken for this visit.   Mental Status Exam        [9, 14 cog gs]     Alertness: alert  and oriented  Appearance: adequately groomed  Behavior/Demeanor: cooperative, pleasant and calm, with good  eye contact   Speech: normal and regular rate and rhythm  Language: no problems  Psychomotor: normal or unremarkable  Mood: recently stable  Affect: restricted and appropriate; was congruent to mood; was congruent to content  Thought Process/Associations: unremarkable  Thought Content:  Reports none;  Denies suicidal and violent ideation, delusions, preoccupations, obsessions , phobia , magical thinking, over-valued ideas and paranoid ideation  Perception:  Reports none;  Denies auditory hallucinations, visual hallucinations, visual distortion seen as shadows , depersonalization and derealization  Insight: fair  Judgment: adequate for safety  Cognition: appears grossly intact; formal cognitive testing was not done  Gait/Station and/or Muscle Strength/Tone: N/A    Labs and Data                          Rating Scales:      Answers for HPI/ROS submitted by the patient on 2/24/2023  If you checked off any problems, how difficult have these problems made it for you to do your work, take care of things at home, or get along with other people?: Somewhat difficult  PHQ9 TOTAL SCORE: 3    PHQ9 Today:    PHQ 2/24/2023 2/24/2023   PHQ-9 Total Score 3 3   Q9: Thoughts of better off dead/self-harm past 2 weeks Not at all Not at all     Diagnosis      Impression includes recurrent, moderate MDD in partial remission and GAVIN responding to meds and therapy  - per patient, previous diagnosis of ADHD is managed with behavioral skills as stimulant was activating  - per Mom, medical trauma from snowboarding accident in 2022     Assessment      [m2, h3]     TODAY, the following items were reviewed:     ([place] & [place]): 02/2023- lorazepam 0.5mg #30 last filled  8/22/2022    PSYCHOTROPIC DRUG INTERACTIONS: none with Effexor, Ativan    Drug Interaction Management: Monitoring for adverse effects, routine vitals, using lowest therapeutic dose of [psychotropics] and patient is aware of risks    Plan                                                                                                                     m2, h3     1) discussed options, risks, benefits including risks of long term BZD use, his age, impact of alcohol on anxiety, importance of sobriety for meds and therapy to be most effective, his own limits around caffeine, validated his skill building efforts, his ability to be forthcoming with writer and therapist:    - today he chooses to continue venlafaxine 150mg QAM (has), lorazepam 0.5mg daily PRN (needs, reviewed writer's conservative prescribing style, agreed to fill up to #60 per year as needed or #5/ month, no RF unless it is needed)    2) he'll monitor brain zaps as they occur and if there is a pattern around dosing     3) established in therapy with Dr. Nawaf Abbott    RTC: 4 weeks, sooner as needed    CRISIS NUMBERS:   Provided routinely in AVS.    Treatment Risk Statement:  The patient understands the risks, benefits, adverse effects and alternatives. Agrees to treatment with the capacity to do so. No medical contraindications to treatment. Agrees to call clinic for any problems. The patient understands to call 911 or go to the nearest ED if life threatening or urgent symptoms occur.     WHODAS 2.0  TODAY total score = N/A; [a 12-item WHODAS 2.0 assessment was not completed by the pt today and/or recorded in EPIC].    PROVIDER:  JENNIFER Alvarez CNP

## 2023-02-24 NOTE — NURSING NOTE
Is the patient currently in the state of [place]? YES    Visit mode:VIDEO    If the visit is dropped, the patient can be reconnected by: VIDEO VISIT: Text to cell phone: [phone]    Will anyone else be joining the visit? NO      How would you like to obtain your AVS? MyChart    Are changes needed to the allergy or medication list? NO    Reason for visit: Follow-up      
No

## 2023-02-28 ENCOUNTER — VIRTUAL VISIT (OUTPATIENT)
Dept: PSYCHIATRY | Facility: CLINIC | Age: 23
End: 2023-02-28
Attending: PSYCHOLOGIST
Payer: COMMERCIAL

## 2023-02-28 DIAGNOSIS — F32.5 DEPRESSION, MAJOR, IN REMISSION (H): Primary | ICD-10-CM

## 2023-02-28 PROCEDURE — 90834 PSYTX W PT 45 MINUTES: CPT | Mod: VID

## 2023-03-09 NOTE — PROGRESS NOTES
Video- Visit Details  Type of service:  video visit for mental health treatment.  Time of service:    Date:  2/28/2022    Video Start Time:  3:02 PM        Video End Time: 3:52 PM    Reason for video visit: Client preference  Originating Site (patient location): Patient's room at [facility]  Distant Site (provider location):  [facility]  Mode of Communication:  Video Conference via AmWell      Consent: This virtual video visit will be conducted via a video call between the client and his therapist. This service lets us provide the care the patient needs with a video conversation. If during the course of the video call the provider feels a video visit is not appropriate, the patient will not be charged for this service. The patient has verbally consented to: the potential risks and benefits of telemedicine (video) versus in person care; bill my insurance or make self-payment for services provided; and responsibility for payment of non-covered services.     How would the patient like to obtain the AVS?  Martha    As the provider I attest to compliance with applicable laws and regulations related to telemedicine.    OUTPATIENT PSYCHOTHERAPY PROGRESS NOTE    Client Name: Barrett Bazan   YOB: 2000 (22 year old)   Date of Service:  Feb 28, 2023  Time of Service: 3:02 pm to 3:52 pm (50 minutes)  Service Type(s):82499 psychotherapy (50 min. with patient)    Diagnoses:   Encounter Diagnosis   Name Primary?     Depression, major, in remission (H) Yes       Individuals Present:   Client attended alone    Treatment goal(s) being addressed:  1) Decreasing the frequency and intensity of anxiety attacks  2) Improve confidence, emotional intelligence and mental strength    Subjective:  Barrett started the session by stating that he'd spent a few rough nights starting last Sunday. He complained of not being able to fall sleep and then finding himself overly focused on his bodily sensations. He  mentioned his heart beat picks up and also mentioned light flashes while having his eyes shut. He struggles with these sensations which leads to thoughts of impending doom that makes him leave his room and go downstairs, taking a Trazodone and come back to his bed when he gets sleepy. He confirmed that these symptoms tend to appear mostly on Sundays. He identified having more free time on his hand could be contributing to his presentation. We discussed the pros and cons of leaving the anxiety provoking situation and how it could reinforce avoidant behaviors that could worsen the anxiety. We discussed him considering the alternative of recording himself going over his thoughts and sensations at the time of an anxiety attack instead of leaving his room. He stated his mood was 7/10 and denied SI/SIB/HI/AVH.     Treatment:   Supportive therapy     Assessment and Progress:   Barrett is a 22 year-old with history of depression and anxiety who is referred by psychiatrist for supportive psychotherapy with the working diagnosis of depressive disorder in remission. Barrett meets with therapist on a weekly basis to continue working on the treatment goals.    Plan:   Next therapy appointment has been scheduled in two weeks to continue work on treatment goals.    Treatment Plan          SYMPTOMS; PROBLEMS   MEASURABLE GOALS;    FUNCTIONAL IMPROVEMENT INTERVENTIONS;   GAINS MADE DISCHARGE CRITERIA   Depression: depressed mood  Psychosocial: mental health symptoms and relationship stress   reduce depressive symptoms, find enjoyment at least once a day, report feeling more positive about self  and increase time spent with others acceptance of limitations/reality  psycho-education   psychotherapy  relaxation techniques   self-care skills  strength focus marked symptom improvement and reduced visit frequency     Date of most recent treatment plan: 2/7/2023  Date next treatment plan due: 3 months    Patient did not report any changes to  medications    Psychotherapy services during this visit included myself and the patient. Patient agreed with treatment plan. Discussed case with supervisor who also agreed with the treatment plan. Unable to sign in person due to visit being conducted through telehealth.     Aguilar Mccracken MD  PGY2 Psychiatry    Case discussed with attending therapist who was not present in person.

## 2023-03-14 ENCOUNTER — VIRTUAL VISIT (OUTPATIENT)
Dept: PSYCHIATRY | Facility: CLINIC | Age: 23
End: 2023-03-14
Attending: PSYCHOLOGIST
Payer: COMMERCIAL

## 2023-03-14 DIAGNOSIS — F32.5 DEPRESSION, MAJOR, IN REMISSION (H): ICD-10-CM

## 2023-03-14 DIAGNOSIS — F41.0 PANIC DISORDER WITHOUT AGORAPHOBIA: Primary | ICD-10-CM

## 2023-03-14 PROCEDURE — 90834 PSYTX W PT 45 MINUTES: CPT | Mod: VID

## 2023-03-28 ENCOUNTER — VIRTUAL VISIT (OUTPATIENT)
Dept: PSYCHIATRY | Facility: CLINIC | Age: 23
End: 2023-03-28
Attending: PSYCHOLOGIST
Payer: COMMERCIAL

## 2023-03-28 DIAGNOSIS — F32.5 DEPRESSION, MAJOR, IN REMISSION (H): ICD-10-CM

## 2023-03-28 DIAGNOSIS — F41.0 PANIC DISORDER WITHOUT AGORAPHOBIA: Primary | ICD-10-CM

## 2023-03-28 PROCEDURE — 90837 PSYTX W PT 60 MINUTES: CPT | Mod: VID

## 2023-03-28 ASSESSMENT — PATIENT HEALTH QUESTIONNAIRE - PHQ9
SUM OF ALL RESPONSES TO PHQ QUESTIONS 1-9: 13
SUM OF ALL RESPONSES TO PHQ QUESTIONS 1-9: 13
10. IF YOU CHECKED OFF ANY PROBLEMS, HOW DIFFICULT HAVE THESE PROBLEMS MADE IT FOR YOU TO DO YOUR WORK, TAKE CARE OF THINGS AT HOME, OR GET ALONG WITH OTHER PEOPLE: VERY DIFFICULT

## 2023-03-28 NOTE — PROGRESS NOTES
Video- Visit Details  Type of service:  video visit for mental health treatment.  Time of service:    Date:  3/14/2022    Video Start Time:  3:01 PM        Video End Time: 3:50 PM    Reason for video visit: Client preference  Originating Site (patient location): Patient's room at [facility]  Distant Site (provider location):  Provider's home office  Mode of Communication:  Video Conference via AmWell      Consent: This virtual video visit will be conducted via a video call between the client and his therapist. This service lets us provide the care the patient needs with a video conversation. If during the course of the video call the provider feels a video visit is not appropriate, the patient will not be charged for this service. The patient has verbally consented to: the potential risks and benefits of telemedicine (video) versus in person care; bill my insurance or make self-payment for services provided; and responsibility for payment of non-covered services.     How would the patient like to obtain the AVS?  Martha    As the provider I attest to compliance with applicable laws and regulations related to telemedicine.    OUTPATIENT PSYCHOTHERAPY PROGRESS NOTE    Client Name: Barrett Bazan   YOB: 2000 (22 year old)   Date of Service:  Mar 14, 2023  Time of Service: 3:01 pm to 3:50 pm (49 minutes)  Service Type(s):01071 psychotherapy (49 min. with patient)    Diagnoses:   Encounter Diagnosis   Name Primary?     Depression, major, in remission (H) Yes       Individuals Present:   Client attended alone    Treatment goal(s) being addressed:  1) Decreasing the frequency and intensity of anxiety attacks      Subjective:  In this session the writer talked about the CBT technic and how it can help with reducing unwanted behaviors by breaking the cycle of avoidance/behavior reinforcement. The ERP technic was introduced to the patient and Barrett showed complete understanding of the goal of the  treatment. He was instructed to prepare a worksheet that can be used in the next CBT sessions to track progress. He stated his mood was 8/10 and denied SI/SIB/HI/AVH.     Treatment:   Cognitive Behavioral therapy     Assessment and Progress:   Barrett is a 22 year-old with history of depression and anxiety who is referred by psychiatrist for supportive psychotherapy with the working diagnosis of depressive disorder in remission. Barrett was found to benefit from a 12 week course of CBT in the format of EPR. He meets with therapist on a weekly basis to continue working on the treatment goals.    Plan:   Next therapy appointment has been scheduled in two weeks to continue work on treatment goals.    Treatment Plan          SYMPTOMS; PROBLEMS   MEASURABLE GOALS;    FUNCTIONAL IMPROVEMENT INTERVENTIONS;   GAINS MADE DISCHARGE CRITERIA   Depression: depressed mood, insomnia and appetite changes  Panic Attacks: dizziness, palpitations, racing heart, tremor and fear   reduce panic attacks/ excessive worry, make a plan to manage 2-3 anxiety-provoking situations and increase time spent with others homework assignments  medications   psycho-education   psychotherapy  relaxation techniques  marked symptom improvement and reduced visit frequency     Date of most recent treatment plan: 2/28/2023  Date next treatment plan due: 3 months    Patient did not report any changes to medications    Psychotherapy services during this visit included myself and the patient. Patient agreed with treatment plan. Discussed case with supervisor who also agreed with the treatment plan. Unable to sign in person due to visit being conducted through telehealth.     Aguilar Mccracken MD  PGY2 Psychiatry    Case discussed with attending therapist who was not present in person.

## 2023-04-03 NOTE — PROGRESS NOTES
"Video- Visit Details  Type of service:  video visit for mental health treatment.  Time of service:    Date:  3/28/2022    Video Start Time:  3:02 PM        Video End Time: 3:58 PM    Reason for video visit: Client preference  Originating Site (patient location): Patient's room at [facility]  Distant Site (provider location):  [facility]  Mode of Communication:  Video Conference via AmWell      Consent: This virtual video visit will be conducted via a video call between the client and his therapist. This service lets us provide the care the patient needs with a video conversation. If during the course of the video call the provider feels a video visit is not appropriate, the patient will not be charged for this service. The patient has verbally consented to: the potential risks and benefits of telemedicine (video) versus in person care; bill my insurance or make self-payment for services provided; and responsibility for payment of non-covered services.     How would the patient like to obtain the AVS?  Martha    As the provider I attest to compliance with applicable laws and regulations related to telemedicine.    OUTPATIENT PSYCHOTHERAPY PROGRESS NOTE    Client Name: Barrett Bazan   YOB: 2000 (22 year old)   Date of Service:  Mar 28, 2023  Time of Service: 3:02 pm to 3:58 pm (56 minutes)  Service Type(s):56574 psychotherapy (56 min. with patient)    Diagnoses:   Encounter Diagnosis   Name Primary?     Depression, major, in remission (H) Yes       Individuals Present:   Client attended alone    Treatment goal(s) being addressed:  1) Decreasing the frequency and intensity of anxiety attacks      Subjective:  Barrett has just recovered from a viral illness. He was physically ill however anxiety and depression were \"under control\". He said his \"body was distracted by the illness\". He rated his mood a 6/10 and denied SI/SIB/HI/AVH. Writer reviewed the Exposure Response Prevention technic with Barrett " "and the majority of the session was dedicated to fill out the \"Exposure Hierarchy\" with the client.    Treatment:   Cognitive Behavioral therapy     Assessment and Progress:   Barrett is a 22 year-old with history of depression and anxiety who is referred by psychiatrist for supportive psychotherapy with the working diagnosis of depressive disorder in remission. Barrett was found to benefit from a 12 week course of CBT in the format of EPR. He meets with therapist on a weekly basis to continue working on the treatment goals.     Plan:   Client is going to work on [10/100] exposures and documenting the level of difficulty of the exposures each time. Next therapy appointment has been scheduled in a week to continue work on treatment goals.    Treatment Plan          SYMPTOMS; PROBLEMS   MEASURABLE GOALS;    FUNCTIONAL IMPROVEMENT INTERVENTIONS;   GAINS MADE DISCHARGE CRITERIA   Depression: depressed mood, insomnia and appetite changes  Panic Attacks: dizziness, palpitations, racing heart, tremor and fear   reduce panic attacks/ excessive worry, make a plan to manage 2-3 anxiety-provoking situations and increase time spent with others homework assignments  medications   psycho-education   psychotherapy  relaxation techniques  marked symptom improvement and reduced visit frequency     Date of most recent treatment plan: 2/28/2023  Date next treatment plan due: 3 months    Patient did not report any changes to medications    Psychotherapy services during this visit included myself and the patient. Patient agreed with treatment plan. Discussed case with supervisor who also agreed with the treatment plan. Unable to sign in person due to visit being conducted through telehealth.     Aguilar Mccracken MD  PGY2 Psychiatry    Case discussed with attending therapist who was not present in person.     Answers for HPI/ROS submitted by the patient on 3/28/2023  If you checked off any problems, how difficult have these problems made it " for you to do your work, take care of things at home, or get along with other people?: Very difficult  PHQ9 TOTAL SCORE: 13

## 2023-04-04 ENCOUNTER — VIRTUAL VISIT (OUTPATIENT)
Dept: PSYCHIATRY | Facility: CLINIC | Age: 23
End: 2023-04-04
Attending: PSYCHOLOGIST
Payer: COMMERCIAL

## 2023-04-04 DIAGNOSIS — F32.5 DEPRESSION, MAJOR, IN REMISSION (H): ICD-10-CM

## 2023-04-04 DIAGNOSIS — F41.8 ANXIETY WITH SOMATIC FEATURES: Primary | ICD-10-CM

## 2023-04-04 PROCEDURE — 90837 PSYTX W PT 60 MINUTES: CPT | Mod: VID

## 2023-04-04 ASSESSMENT — PATIENT HEALTH QUESTIONNAIRE - PHQ9
SUM OF ALL RESPONSES TO PHQ QUESTIONS 1-9: 5
10. IF YOU CHECKED OFF ANY PROBLEMS, HOW DIFFICULT HAVE THESE PROBLEMS MADE IT FOR YOU TO DO YOUR WORK, TAKE CARE OF THINGS AT HOME, OR GET ALONG WITH OTHER PEOPLE: SOMEWHAT DIFFICULT
SUM OF ALL RESPONSES TO PHQ QUESTIONS 1-9: 5

## 2023-04-04 NOTE — PROGRESS NOTES
Video- Visit Details  Type of service:  video visit for mental health treatment.  Time of service:    Date:  4/4/2022    Video Start Time:  3:07 PM        Video End Time: 4:07 PM    Reason for video visit: Client preference  Originating Site (patient location): Patient's room at [facility]  Distant Site (provider location):  Provider's home office  Mode of Communication:  Video Conference via AmWell      Consent: This virtual video visit will be conducted via a video call between the client and his therapist. This service lets us provide the care the patient needs with a video conversation. If during the course of the video call the provider feels a video visit is not appropriate, the patient will not be charged for this service. The patient has verbally consented to: the potential risks and benefits of telemedicine (video) versus in person care; bill my insurance or make self-payment for services provided; and responsibility for payment of non-covered services.     How would the patient like to obtain the AVS?  Martha    As the provider I attest to compliance with applicable laws and regulations related to telemedicine.    OUTPATIENT PSYCHOTHERAPY PROGRESS NOTE    Client Name: Barrett Bazan   YOB: 2000 (22 year old)   Date of Service:  Apr 4, 2023  Time of Service: 3:07 pm to 4:07 pm (60 minutes)  Service Type(s):18399 psychotherapy (60 min. with patient)    Diagnoses:   Encounter Diagnosis   Name Primary?        Depression, major, in remission (H)  Anxiety with somatic features Yes  Yes       Individuals Present:   Client attended alone    Treatment goal(s) being addressed:  1) Decreasing the frequency and intensity of anxiety attacks      Subjective:  In this session Barrett talked about the upcoming finals season as a source of anxiety which is in three weeks. The specific reasons for the statement was pointed out and he was able to identify strategies to help with staying on task and  "focused. He stated that strategies like making lists and micro-scheduling his days has been helpful to balance the school work with social life and personal goals. Mood 7/10, anxiety \"under control\". He denied SI/SIB/HI/AVH.     In the last third of the session writer provided in-session ERP and the SUDS was recorded.    Situation to practice: Looking at schematics of heart during a heart attack. Reading out loud an overview of heart attack in a medical web site.    Date SUDS begining SUDS 5 min SUDS 10 min SUDS 15 min SUDS 20 min Suds 25 min SUDS 30 min   4/4/23 40 50 40 30 20                                     Treatment:  Cognitive Behavioral therapy     Assessment and Progress:   Barrett is a 22 year-old with history of depression and anxiety who is referred by psychiatrist for supportive psychotherapy with the working diagnosis of depressive disorder in remission. Barrett was found to benefit from a 12 week course of CBT in the format of EPR. He meets with therapist on a weekly basis to continue working on the treatment goals.     Plan:   Client is going to work on [10/100] exposures and documenting the level of difficulty of the exposures each time. Next therapy appointment has been scheduled in a week to continue work on treatment goals.    Treatment Plan          SYMPTOMS; PROBLEMS   MEASURABLE GOALS;    FUNCTIONAL IMPROVEMENT INTERVENTIONS;   GAINS MADE DISCHARGE CRITERIA   Depression: depressed mood, insomnia and appetite changes  Panic Attacks: dizziness, palpitations, racing heart, tremor and fear   reduce panic attacks/ excessive worry, make a plan to manage 2-3 anxiety-provoking situations and increase time spent with others homework assignments  medications   psycho-education   psychotherapy  relaxation techniques  marked symptom improvement and reduced visit frequency     Date of most recent treatment plan: 2/28/2023  Date next treatment plan due: 3 months    Patient did not report any changes to " medications    Psychotherapy services during this visit included myself and the patient. Patient agreed with treatment plan. Discussed case with supervisor who also agreed with the treatment plan. Unable to sign in person due to visit being conducted through telehealth.     Aguilar Mccracken MD  PGY2 Psychiatry    Case discussed with attending therapist who was not present in person.     Answers for HPI/ROS submitted by the patient on 4/4/2023  If you checked off any problems, how difficult have these problems made it for you to do your work, take care of things at home, or get along with other people?: Somewhat difficult  PHQ9 TOTAL SCORE: 5

## 2023-04-18 ENCOUNTER — VIRTUAL VISIT (OUTPATIENT)
Dept: PSYCHIATRY | Facility: CLINIC | Age: 23
End: 2023-04-18
Attending: PSYCHOLOGIST
Payer: COMMERCIAL

## 2023-04-18 DIAGNOSIS — F32.5 DEPRESSION, MAJOR, IN REMISSION (H): ICD-10-CM

## 2023-04-18 DIAGNOSIS — F41.0 PANIC DISORDER WITHOUT AGORAPHOBIA: Primary | ICD-10-CM

## 2023-04-18 PROCEDURE — 90837 PSYTX W PT 60 MINUTES: CPT | Mod: VID

## 2023-04-18 ASSESSMENT — PATIENT HEALTH QUESTIONNAIRE - PHQ9
10. IF YOU CHECKED OFF ANY PROBLEMS, HOW DIFFICULT HAVE THESE PROBLEMS MADE IT FOR YOU TO DO YOUR WORK, TAKE CARE OF THINGS AT HOME, OR GET ALONG WITH OTHER PEOPLE: SOMEWHAT DIFFICULT
10. IF YOU CHECKED OFF ANY PROBLEMS, HOW DIFFICULT HAVE THESE PROBLEMS MADE IT FOR YOU TO DO YOUR WORK, TAKE CARE OF THINGS AT HOME, OR GET ALONG WITH OTHER PEOPLE: SOMEWHAT DIFFICULT
SUM OF ALL RESPONSES TO PHQ QUESTIONS 1-9: 4

## 2023-04-19 ENCOUNTER — VIRTUAL VISIT (OUTPATIENT)
Dept: PSYCHIATRY | Facility: CLINIC | Age: 23
End: 2023-04-19
Attending: NURSE PRACTITIONER
Payer: COMMERCIAL

## 2023-04-19 DIAGNOSIS — F41.1 GAD (GENERALIZED ANXIETY DISORDER): ICD-10-CM

## 2023-04-19 DIAGNOSIS — F32.5 DEPRESSION, MAJOR, IN REMISSION (H): ICD-10-CM

## 2023-04-19 PROCEDURE — 99214 OFFICE O/P EST MOD 30 MIN: CPT | Mod: VID | Performed by: NURSE PRACTITIONER

## 2023-04-19 RX ORDER — VENLAFAXINE HYDROCHLORIDE 150 MG/1
150 CAPSULE, EXTENDED RELEASE ORAL DAILY
Qty: 90 CAPSULE | Refills: 1 | Status: SHIPPED | OUTPATIENT
Start: 2023-04-19 | End: 2023-06-29

## 2023-04-19 NOTE — PROGRESS NOTES
"Virtual Visit Details    Type of service:  Video Visit     Originating Location (pt. Location): Home  Distant Location (provider location):  Off-site  Platform used for Video Visit: [facility]  PSYCHIATRIC PROGRESS NOTE       Barrett Bazan is a 22 year old male who prefers the name Barrett and pronouns he, him.  Therapist: established with Dr. Nawaf Abbott  PCP: Eileen Andrew  Other Providers: followed by Dr Andrew in [place] as PCP    PREVIOUS PSYCH MED TRIALS:  - Lexapro 20mg (trialed first)  - Vyvanse 20mg (activated anxiety)  - olanzapine zydis 5mg (given in 10/2021 ER note for marijuana abuse)  - trazodone 50mg  - Zoloft, Adderall, hydroxyzine trials referred to in chart    Pertinent Background:  See previous notes.  Psych critical item history includes one week admit in [place] in 2019, history of active SI with plan to shoot himself in 2019,     Interim History         The patient is a fair historian and reports good treatment adherence.    Last seen on 2/24/2023 when he chose to continue continue venlafaxine 150mg QAM, lorazepam 0.5mg daily PRN.    Assessed in ER in Oct 2021 for marijuana abuse, in Feb 2022 for palpitations (unremarkable EKG, 450 QTc), in June 2022 for anxiety.    He presents alone, his Mom was not present.    Since the last visit, he's been \"fairly well\".  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[employer], majoring in global studies  - he's taking 18 credits at present, he's pretty well " "managing homework, reading, classes  - enjoys friends, going out to the bar, exercise  - support from his Mom and therapist, he often prefers to keep things private from friends  - coping skills are \"better than before\", usng CBT skills like seek support, \"to face the anxiety and neutralize it\", practices grounding techniques like 5,4,3,2,1, paced breathing, self affirm and self talk, practicing gratitude, support seeking, talking to friends, drinking water, eating, journaling    Recent Symptoms:   Depression:  PHQ 4, \"depression is not a problem at all\", rates depression as a 1 if 10 is the worst, denies SI     Anxiety: rates anxiety as a 3 if 10 is the worst, notes symptoms wax and wane, better sense of control over acute symptoms   - transitions and stress appear to increase anxiety, finals, feeling trapped in a long car ride, visiting extended family or somewhere new is hard  - Mom Is treated for OCD, she observes obsessive thought pattern in Barrett; per chart, history of \"obsessive negative thoughts\"    ADVERSE EFFECTS: none  MEDICAL CONCERNS: none today    APPETITE: eating consistently, 175# in Feb 2022  SLEEP: takes trazodone 50mg infrequently, sleeping 6-7 hours during the week and up to 12 hours on weekend     Recent Substance Use:  Alcohol- aware anxiety increases when he's been drinking or hung over, recently triggered on a plane ride while hung over  - drinking socially 2-3x weekly  - drinks socially, might drink 3 drinks or even 10 drinks over 5-6 hours  - denies passing or blacking out  - prefers beer and mixed drinks    Caffeine- 2 cups coffee daily without elevated HR, dislikes soda and energy drinks   Cannabis- none        Social/ Family History            FINANCIAL SUPPORT- college student at [employer]       CHILDREN- None       LIVING SITUATION- lives in his own room in the [facility]   LEGAL- None  EARLY HISTORY/ EDUCATION- born and raised in [place], born to  parents. Sister Dalia (b. 2004). Current " N student. He spends summers at home in [place].  SOCIAL/ SPIRITUAL SUPPORT- social support from Mom, therapist; identifies as not Scientologist       CULTURAL INFLUENCES/ IMPACT- none       TRAUMA HISTORY- Mom perceives snow boarding accident in  as medical trauma  FEELS SAFE AT HOME- Yes   FAMILY PSYCH HISTORY: diffuse depression on maternal side; Mom- treated for OCD with fluvoxamine effectively, MGM- depression, MGF-  by suicide (shooting)    Medical / Surgical History                                 Patient Active Problem List   Diagnosis     Depression, major, in remission (H)       No past surgical history on file.     Medical Review of Systems              A comprehensive review of systems was performed and is negative other than noted in the HPI.    Snowboarding accident in  (torn labrum); denies TBI, LOC. Denies seizures.     Allergy    Amoxicillin-pot clavulanate, Oxycodone, and Doxycycline  Current Medications        Current Outpatient Medications   Medication Sig Dispense Refill     LORazepam (ATIVAN) 0.5 MG tablet Take one half to one (0.5 - 1) tab daily as needed for anxiety 5 tablet 0     venlafaxine (EFFEXOR XR) 150 MG 24 hr capsule Take 1 capsule (150 mg) by mouth daily 90 capsule 0     Vitals         [3, 3]   There were no vitals taken for this visit.   Mental Status Exam          Alertness: alert  and oriented  Appearance: adequately groomed  Behavior/Demeanor: cooperative, pleasant and calm, with good  eye contact   Speech: normal and regular rate and rhythm  Language: no problems  Psychomotor: normal or unremarkable  Mood: recently stable  Affect: restricted and appropriate; was congruent to mood; was congruent to content  Thought Process/Associations: unremarkable  Thought Content:  Reports none;  Denies suicidal and violent ideation, delusions, preoccupations, obsessions , phobia , magical thinking, over-valued ideas and paranoid ideation  Perception:  Reports none;  Denies auditory  hallucinations, visual hallucinations, visual distortion seen as shadows , depersonalization and derealization  Insight: fair  Judgment: adequate for safety  Cognition: appears grossly intact; formal cognitive testing was not done  Gait/Station and/or Muscle Strength/Tone: N/A    Labs and Data                          Rating Scales:      Answers for HPI/ROS submitted by the patient on 4/18/2023  If you checked off any problems, how difficult have these problems made it for you to do your work, take care of things at home, or get along with other people?: Somewhat difficult  PHQ9 TOTAL SCORE: 4    PHQ9 Today:        3/28/2023     3:01 PM 4/4/2023     3:08 PM 4/18/2023     3:01 PM   PHQ   PHQ-9 Total Score 13 5 4    4   Q9: Thoughts of better off dead/self-harm past 2 weeks Not at all Not at all Not at all    Not at all     Diagnosis      Impression includes recurrent, moderate MDD in partial remission and GAVIN responding to meds and therapy  - per patient, previous diagnosis of ADHD is managed with behavioral skills as stimulant was activating  - per Mom, medical trauma from snowboarding accident in 2022     Assessment         TODAY, the following items were reviewed:     ([place] & [place]): 02/2023- lorazepam 0.5mg #30 last filled 8/22/2022    PSYCHOTROPIC DRUG INTERACTIONS: none with Effexor, Ativan    Drug Interaction Management: Monitoring for adverse effects, routine vitals, using lowest therapeutic dose of [psychotropics] and patient is aware of risks    Plan                                                                                                                     1) discussed options, risks, benefits including risks of alcohol, reduced recent BZD use, impact of alcohol/ caffeine on anxiety, importance of sobriety for meds and therapy to be most effective, his willingness to be forthcoming with writer and therapist:    - today he chooses to continue venlafaxine 150mg QAM (needs, sent two 90 day fills to [facility]  are pharmacy), lorazepam 0.5mg daily PRN (has)  - reviewed writer's conservative prescribing style, agreed to fill up to #60 lorazepam per year as needed or #5/ month, no RF unless needed)  - pending symptoms/ mood over summer break, he'd like to talk about potential venlafaxine reduction at RTC, monitoring brain zaps, validated efforts for consistent med adherence  - he's at home in [place] between mid May and late Aug    2) working on a plan with therapist for summer break     RTC: 5 months, sooner as needed    CRISIS NUMBERS:   Provided routinely in AVS.    Treatment Risk Statement:  The patient understands the risks, benefits, adverse effects and alternatives. Agrees to treatment with the capacity to do so. No medical contraindications to treatment. Agrees to call clinic for any problems. The patient understands to call 911 or go to the nearest ED if life threatening or urgent symptoms occur.     WHODAS 2.0  TODAY total score = N/A; [a 12-item WHODAS 2.0 assessment was not completed by the pt today and/or recorded in EPIC].    PROVIDER:  JENNIFER Alvarez CNP

## 2023-04-24 NOTE — PROGRESS NOTES
"Video- Visit Details  Type of service:  video visit for mental health treatment.  Time of service:    Date:  4/18/2022    Video Start Time:  3:02 PM        Video End Time: 4:00 PM    Reason for video visit: Client preference  Originating Site (patient location): Patient's room at [facility]  Distant Site (provider location):  [facility]  Mode of Communication:  Video Conference via AmWell      Consent: This virtual video visit will be conducted via a video call between the client and his therapist. This service lets us provide the care the patient needs with a video conversation. If during the course of the video call the provider feels a video visit is not appropriate, the patient will not be charged for this service. The patient has verbally consented to: the potential risks and benefits of telemedicine (video) versus in person care; bill my insurance or make self-payment for services provided; and responsibility for payment of non-covered services.     How would the patient like to obtain the AVS?  Martha    As the provider I attest to compliance with applicable laws and regulations related to telemedicine.    OUTPATIENT PSYCHOTHERAPY PROGRESS NOTE    Client Name: Barrett Bazan   YOB: 2000 (22 year old)   Date of Service:  Apr 18, 2023  Time of Service: 3:02 pm to 4:00 pm (58 minutes)  Service Type(s): 05869 psychotherapy (58 min. with patient)    Diagnoses:   Encounter Diagnosis   Name Primary?        Depression, major, in remission (H)  Panic Disorder with agoraphobia Yes  Yes       Individuals Present:   Client attended alone    Treatment goal(s) being addressed:  1) Decreasing the frequency and intensity of anxiety attacks      Subjective:  Barrett showed up to the appointment on-time and mentioned that he could not make it to the previous session because of a test he had to study for. He was happy and \"proud\" that he was able to implement CBT in the past two weeks in his life at least " "in three situations:  1- while taking a test he started feeling fidgety, uncomfortable and anxious => he did not leave his seat, took the paper and put it on his lap and managed to go through the test, and did better than expected  2- During the intense phase of a work out session where he noticed a heart beat skipping => continued his work out regardless of the fear of a heart attack  3- trouble falling asleep at night => he distracted himself with listening to music  We discussed if he sees distraction as a CBT technic or a form of avoidance behavior. He was encouraged to stay in the anxious state longer each time with the goal of habituating the mind to the trigger.    In the second half of the appointment in-session ERP was conducted. The triggers were watching a 99teststube video of heart attack animation and discussing about the distress level in each part. Barrett committed himself to do at least three at home ERP and documenting them for the next session for review.    Barrett rated his mood as 8/10 and anxiety \"none\". He denied SI/SIB/HI/AVH.       Situation to practice: Watching an illustrative animation of how a heart attack develops and discussion on it.    Date SUDS begining SUDS 5 min SUDS 10 min SUDS 15 min SUDS 20 min Suds 25 min SUDS 30 min   4/4/23 40 50 40 30 20     4/18/23 30 20 20 10 10                           Treatment:  Cognitive Behavioral therapy     Assessment and Progress:   Barrett is a 22 year-old with history of depression and anxiety who is referred by psychiatrist for supportive psychotherapy with the working diagnosis of depressive disorder in remission. Barrett was found to benefit from a 12 week course of CBT in the format of EPR. He meets with therapist on a weekly basis to continue working on the treatment goals.     Plan:   Client is going to work on [10/100] exposures and documenting the level of difficulty of the exposures each time. Next therapy appointment has been scheduled in a " week to continue work on treatment goals.    Treatment Plan          SYMPTOMS; PROBLEMS   MEASURABLE GOALS;    FUNCTIONAL IMPROVEMENT INTERVENTIONS;   GAINS MADE DISCHARGE CRITERIA   Depression: depressed mood, insomnia and appetite changes  Panic Attacks: dizziness, palpitations, racing heart, tremor and fear   reduce panic attacks/ excessive worry, make a plan to manage 2-3 anxiety-provoking situations and increase time spent with others homework assignments  medications   psycho-education   psychotherapy  relaxation techniques  marked symptom improvement and reduced visit frequency     Date of most recent treatment plan: 2/28/2023  Date next treatment plan due: 3 months    Patient did not report any changes to medications    Psychotherapy services during this visit included myself and the patient. Patient agreed with treatment plan. Discussed case with supervisor who also agreed with the treatment plan. Unable to sign in person due to visit being conducted through telehealth.     Aguilar Mccracken MD  PGY2 Psychiatry    Case discussed with attending therapist who was not present in person.   Answers for HPI/ROS submitted by the patient on 4/18/2023  If you checked off any problems, how difficult have these problems made it for you to do your work, take care of things at home, or get along with other people?: Somewhat difficult  PHQ9 TOTAL SCORE: 4

## 2023-04-25 ENCOUNTER — VIRTUAL VISIT (OUTPATIENT)
Dept: PSYCHIATRY | Facility: CLINIC | Age: 23
End: 2023-04-25
Attending: PSYCHOLOGIST
Payer: COMMERCIAL

## 2023-04-25 DIAGNOSIS — F32.5 DEPRESSION, MAJOR, IN REMISSION (H): Primary | ICD-10-CM

## 2023-04-25 DIAGNOSIS — F41.0 PANIC DISORDER WITHOUT AGORAPHOBIA: ICD-10-CM

## 2023-04-25 PROCEDURE — 90837 PSYTX W PT 60 MINUTES: CPT | Mod: VID

## 2023-04-25 ASSESSMENT — PATIENT HEALTH QUESTIONNAIRE - PHQ9
SUM OF ALL RESPONSES TO PHQ QUESTIONS 1-9: 5
SUM OF ALL RESPONSES TO PHQ QUESTIONS 1-9: 5
10. IF YOU CHECKED OFF ANY PROBLEMS, HOW DIFFICULT HAVE THESE PROBLEMS MADE IT FOR YOU TO DO YOUR WORK, TAKE CARE OF THINGS AT HOME, OR GET ALONG WITH OTHER PEOPLE: SOMEWHAT DIFFICULT

## 2023-04-30 NOTE — PROGRESS NOTES
Video- Visit Details  Type of service:  video visit for mental health treatment.  Time of service:    Date:  4/25/2022    Video Start Time:  3:00 PM        Video End Time: 3:55 PM    Reason for video visit: Client preference  Originating Site (patient location): Patient's room at [facility]  Distant Site (provider location):  Provider's home office  Mode of Communication:  Video Conference via AmWell      Consent: This virtual video visit will be conducted via a video call between the client and his therapist. This service lets us provide the care the patient needs with a video conversation. If during the course of the video call the provider feels a video visit is not appropriate, the patient will not be charged for this service. The patient has verbally consented to: the potential risks and benefits of telemedicine (video) versus in person care; bill my insurance or make self-payment for services provided; and responsibility for payment of non-covered services.     How would the patient like to obtain the AVS?  Martha    As the provider I attest to compliance with applicable laws and regulations related to telemedicine.    OUTPATIENT PSYCHOTHERAPY PROGRESS NOTE    Client Name: Barrett Bazan   YOB: 2000 (22 year old)   Date of Service:  Apr 25, 2023  Time of Service: 3:00 pm to 3:55 pm (55 minutes)  Service Type(s): 89952 psychotherapy (55 min. with patient)    Diagnoses:   Encounter Diagnosis   Name Primary?        Depression, major, in remission (H)  Panic Disorder with agoraphobia Yes  Yes       Individuals Present:   Client attended alone    Treatment goal(s) being addressed:  1) Decreasing the frequency and intensity of anxiety attacks      Subjective:  Barrett has come down with cold. He has been quite busy with studying for finals. He is still doing interviews for the summer internship and says they are going well. He continues working on anxiety. Since starting the CBT sessions he has  "been able to prolong his work out session lengths and also do more cardio and high intensity training. His level of discomfort and stress related to working out has decreased. He said he had to seek reassurance from mom on Sat. He got lots of brain and body zaps because he took his medication a bit later than he was supposed to. The zaps lasted for about an hour and half way through them he started to develop panic and anxiety symptoms. He is planning to talk with psychiatrist and is frustrated about the fact that he may need to switch medications.    His mood is \"quite and reserved\" and he rated anxiety as \"at baseline\". He denied SI/SIB/HI/AVH.     In-session ERP was deferred today due to client feeling ill.    Barrett is going to leave for the summer break in a few weeks. He stated he is interested in working on his \"driving\". He mentioned that he has a fear of \"passing out while driving\" that prevents him from taking busy roads and highways. He does not own a car in [place], but does drive in [place]. He says he can manage to drive in his neighborhood but driving in packed highways is a strong trigger and he has been avoiding that for years. He was encouraged to draft an anxiety trigger hierarchy specifically for driving so as to discuss it the next session.      Situation to practice: Watching an illustrative animation of how a heart attack develops and discussion on it.    Date SUDS begining SUDS 5 min SUDS 10 min SUDS 15 min SUDS 20 min Suds 25 min SUDS 30 min   4/4/23 40 50 40 30 20     4/18/23 30 20 20 10 10     4/25/23 - - - - -                 Treatment:  Cognitive Behavioral therapy     Assessment and Progress:   Barrett is a 22 year-old with history of depression and anxiety who is referred by psychiatrist for supportive psychotherapy with the working diagnosis of depressive disorder in remission. Barrett was found to benefit from a 12 week course of CBT in the format of EPR. He meets with therapist on a weekly " basis to continue working on the treatment goals.     Plan:   Client is going to work on [10/100] exposures and documenting the level of difficulty of the exposures each time. Next therapy appointment has been scheduled in a week to continue work on treatment goals.    Treatment Plan          SYMPTOMS; PROBLEMS   MEASURABLE GOALS;    FUNCTIONAL IMPROVEMENT INTERVENTIONS;   GAINS MADE DISCHARGE CRITERIA   Depression: depressed mood, insomnia and appetite changes  Panic Attacks: dizziness, palpitations, racing heart, tremor and fear   reduce panic attacks/ excessive worry, make a plan to manage 2-3 anxiety-provoking situations and increase time spent with others homework assignments  medications   psycho-education   psychotherapy  relaxation techniques  marked symptom improvement and reduced visit frequency     Date of most recent treatment plan: 2/28/2023  Date next treatment plan due: 3 months    Patient did not report any changes to medications    Psychotherapy services during this visit included myself and the patient. Patient agreed with treatment plan. Discussed case with supervisor who also agreed with the treatment plan. Unable to sign in person due to visit being conducted through telehealth.     Aguilar Mccracken MD  PGY2 Psychiatry    Case discussed with attending therapist who was not present in person.     Answers for HPI/ROS submitted by the patient on 4/25/2023  If you checked off any problems, how difficult have these problems made it for you to do your work, take care of things at home, or get along with other people?: Somewhat difficult  PHQ9 TOTAL SCORE: 5

## 2023-06-27 ENCOUNTER — MYC MEDICAL ADVICE (OUTPATIENT)
Dept: PSYCHIATRY | Facility: CLINIC | Age: 23
End: 2023-06-27
Payer: COMMERCIAL

## 2023-06-29 DIAGNOSIS — F32.5 DEPRESSION, MAJOR, IN REMISSION (H): ICD-10-CM

## 2023-06-29 DIAGNOSIS — F41.1 GAD (GENERALIZED ANXIETY DISORDER): ICD-10-CM

## 2023-06-29 RX ORDER — VENLAFAXINE HYDROCHLORIDE 37.5 MG/1
37.5 CAPSULE, EXTENDED RELEASE ORAL DAILY
Qty: 30 CAPSULE | Refills: 1 | Status: SHIPPED | OUTPATIENT
Start: 2023-06-29 | End: 2023-09-11

## 2023-06-29 RX ORDER — VENLAFAXINE HYDROCHLORIDE 75 MG/1
CAPSULE, EXTENDED RELEASE ORAL
Qty: 30 CAPSULE | Refills: 0 | Status: SHIPPED | OUTPATIENT
Start: 2023-06-29 | End: 2023-07-17

## 2023-06-30 NOTE — TELEPHONE ENCOUNTER
Reached out to [facility] pharmacy in [place] and spoke with pharmacist who agreed to transferred over Effexor 75 mg and 37.5 mg rx from [facility] to their pharmacy. Patient notified via Celectt.

## 2023-07-17 ENCOUNTER — VIRTUAL VISIT (OUTPATIENT)
Dept: PSYCHIATRY | Facility: CLINIC | Age: 23
End: 2023-07-17
Attending: NURSE PRACTITIONER
Payer: COMMERCIAL

## 2023-07-17 DIAGNOSIS — F41.1 GAD (GENERALIZED ANXIETY DISORDER): ICD-10-CM

## 2023-07-17 DIAGNOSIS — F32.5 DEPRESSION, MAJOR, IN REMISSION (H): Primary | ICD-10-CM

## 2023-07-17 PROCEDURE — 99214 OFFICE O/P EST MOD 30 MIN: CPT | Mod: VID | Performed by: NURSE PRACTITIONER

## 2023-07-17 RX ORDER — FLUOXETINE 10 MG/1
CAPSULE ORAL
Qty: 30 CAPSULE | Refills: 2 | Status: SHIPPED | OUTPATIENT
Start: 2023-07-17 | End: 2023-10-06

## 2023-07-17 ASSESSMENT — PATIENT HEALTH QUESTIONNAIRE - PHQ9
SUM OF ALL RESPONSES TO PHQ QUESTIONS 1-9: 4
10. IF YOU CHECKED OFF ANY PROBLEMS, HOW DIFFICULT HAVE THESE PROBLEMS MADE IT FOR YOU TO DO YOUR WORK, TAKE CARE OF THINGS AT HOME, OR GET ALONG WITH OTHER PEOPLE: SOMEWHAT DIFFICULT
SUM OF ALL RESPONSES TO PHQ QUESTIONS 1-9: 4

## 2023-07-17 ASSESSMENT — PAIN SCALES - GENERAL: PAINLEVEL: NO PAIN (0)

## 2023-07-17 NOTE — PROGRESS NOTES
"Virtual Visit Details    Type of service:  Video Visit     Originating Location (pt. Location): Home  Distant Location (provider location):  Off-site  Platform used for Video Visit: [facility]  PSYCHIATRIC PROGRESS NOTE       Barrett Bazan is a 22 year old male who prefers the name Barrett and pronouns he, him.  Therapist: established with Dr. Nawaf Abbott  PCP: Eileen Andrew  Other Providers: followed by Dr Andrew in [place] as PCP    PREVIOUS PSYCH MED TRIALS:  - Lexapro 20mg (1st trial, \"it fixed all my problems with depression completely then the anxiety started)  - Vyvanse 20mg (activated anxiety)  - olanzapine zydis 5mg (given in 10/2021 ER note for marijuana abuse)  - trazodone 50mg  - Zoloft (ineffective, titrated down \"too fast, really bad panic attack\")  - Adderall, hydroxyzine trials referred to in chart    Pertinent Background:  See previous notes.  Psych critical item history includes one week admit in [place] in 2019, history of active SI with plan to shoot himself in 2019,     Interim History         The patient is a fair historian and reports good treatment adherence.    Last seen on 4/19/2023 when he chose to continue venlafaxine 150mg QAM, lorazepam 0.5mg daily PRN (has)    Between visits, he chose to start reducing venlafaxine to 112.5mg QAM (ineffective, brain zaps, GI pain, dizziness).    Assessed in ER in Oct 2021 for marijuana abuse, in Feb 2022 for palpitations (unremarkable EKG, 450 QTc), in June 2022 for anxiety.    He presents alone, his Mom was not present.    Since the last visit, he's felt \"fine in April and into May, starting to have more side effects with my medication, heavy brain zaps, just making me feel like crap, more panic attacks and what not. No, it wasn't discontinuation\".  - he prefers to not take lorazepam, he may have taken 1-2 pills since last visit  - taking venlafaxine 112.5mg, the reduced dose is better tolerated, " "issues with anxiety \"could be better managed with other medication\"   - taking an RF for trazodone 50mg 2-3x weekly  - he's home for summer, unable to attend therapy, he did reschedule in the next two weeks  - his parents hold the same view as he, they notice his anxiety  - he's a rising senior at [employer], hopes to study abroad in spring, majoring in global studies  - enjoys friends, going out to the bar, exercise  - support from his Mom and therapist, he often prefers to keep things private from friends  - coping skills are improving and include seeking support, face the anxiety and neutralize it, paced breathing, self affirm, self talk, practicing gratitude, drinking water, eating, journaling    Recent Symptoms:   Depression:  PHQ 4, endorses few days of trouble sleeping, low energy, trouble concentrating     Anxiety: rates anxiety in the last two weeks as a 5 if 10 is the worst, very anxious about meds, notes symptoms wax and wane, trying to get back into consistent coping skills    - transitions and stress appear to increase anxiety, finals, feeling trapped in a long car ride, visiting extended family or somewhere new is hard  - Mom Is treated for OCD, she observes obsessive thought pattern in Barrett; per chart, history of \"obsessive negative thoughts\"    ADVERSE EFFECTS: none  MEDICAL CONCERNS: annual physical later this month    APPETITE: varied appetite, notices his blood sugar drops if he skips meals, 175# in Feb 2022  SLEEP: taking trazodone 50mg 2-3x weekly to reduce rumination, once asleep, he's sleeping 8 hours when he can, wakes for work at      Recent Substance Use:  Alcohol- aware anxiety increases when he's been drinking or hung over  - drinking socially, more on campus than at home  - when he drinks socially, he previously reported drinking 3 drinks or even 10 drinks over 5-6 hours  - denies passing or blacking out  - prefers beer and mixed drinks    Caffeine- one cup coffee daily without elevated HR, " dislikes soda and energy drinks   Cannabis- none        Social/ Family History            FINANCIAL SUPPORT- college student at [employer]       CHILDREN- None       LIVING SITUATION- lives in his own room in the frat house   LEGAL- None  EARLY HISTORY/ EDUCATION- born and raised in [place], born to  parents. Sister Dalia (b. ). Current [employer] student. He spends summers at home in [place].  SOCIAL/ SPIRITUAL SUPPORT- social support from Mom, therapist; identifies as not Advent       CULTURAL INFLUENCES/ IMPACT- none       TRAUMA HISTORY- Mom perceives snow boarding accident in  as medical trauma  FEELS SAFE AT HOME- Yes   FAMILY PSYCH HISTORY: diffuse depression on maternal side; Mom- treated for OCD with fluvoxamine effectively, MGM- depression, MGF-  by suicide (shooting)    Medical / Surgical History                                 Patient Active Problem List   Diagnosis     Depression, major, in remission (H)     Panic disorder without agoraphobia       No past surgical history on file.     Medical Review of Systems              A comprehensive review of systems was performed and is negative other than noted in the HPI.    Snowboarding accident in  (torn labrum); denies TBI, LOC. Denies seizures.     Allergy    Amoxicillin-pot clavulanate, Oxycodone, and Doxycycline  Current Medications        Current Outpatient Medications   Medication Sig Dispense Refill     LORazepam (ATIVAN) 0.5 MG tablet Take one half to one (0.5 - 1) tab daily as needed for anxiety 5 tablet 0     venlafaxine (EFFEXOR XR) 37.5 MG 24 hr capsule Take 1 capsule (37.5 mg) by mouth daily 30 capsule 1     venlafaxine (EFFEXOR XR) 75 MG 24 hr capsule Start taper by reducing dose from 150mg to 37.5 plus 75mg cap for a total morning dose of 112.5mg until your appt 30 capsule 0     Vitals         [3, 3]   There were no vitals taken for this visit.   Mental Status Exam          Alertness: alert  and oriented  Appearance: adequately  groomed  Behavior/Demeanor: cooperative, pleasant and calm, with good  eye contact   Speech: normal and regular rate and rhythm  Language: no problems  Psychomotor: normal or unremarkable  Mood: anxious  Affect: restricted and appropriate; was congruent to mood; was congruent to content  Thought Process/Associations: unremarkable  Thought Content:  Reports none;  Denies suicidal and violent ideation, delusions, preoccupations, obsessions , phobia , magical thinking, over-valued ideas and paranoid ideation  Perception:  Reports none;  Denies auditory hallucinations, visual hallucinations, visual distortion seen as shadows , depersonalization and derealization  Insight: fair  Judgment: adequate for safety  Cognition: appears grossly intact; formal cognitive testing was not done  Gait/Station and/or Muscle Strength/Tone: N/A    Labs and Data                          Rating Scales:      Answers for HPI/ROS submitted by the patient on 7/17/2023  If you checked off any problems, how difficult have these problems made it for you to do your work, take care of things at home, or get along with other people?: Somewhat difficult  PHQ9 TOTAL SCORE: 4    PHQ9 Today:        4/18/2023     3:01 PM 4/25/2023     3:02 PM 7/17/2023     9:00 AM   PHQ   PHQ-9 Total Score 4    4 5 4   Q9: Thoughts of better off dead/self-harm past 2 weeks Not at all    Not at all Not at all Not at all     Diagnosis      Impression includes recurrent, moderate MDD in partial remission and GAVIN responding to meds and therapy  - per patient, previous diagnosis of ADHD is managed with behavioral skills as stimulant was activating  - per Mom, medical trauma from snowboarding accident in 2022     Assessment         TODAY, the following items were reviewed:     ([place] & [place]): 02/2023- lorazepam 0.5mg #30 last filled 8/22/2022    PSYCHOTROPIC DRUG INTERACTIONS: none with Effexor, Ativan    Drug Interaction Management: Monitoring for adverse effects, routine  vitals, using lowest therapeutic dose of [psychotropics] and patient is aware of risks    Plan                                                                                                                     1) discussed options, risks, benefits including risks of alcohol and BZD, impact of alcohol/ caffeine on anxiety, importance of sobriety for meds and therapy to be most effective, his willingness to be forthcoming with writer and therapist:    - today, he chooses to continue venlafaxine taper from 112.5mg to 75mg QAM (75mg x 14 days then 37.5mg x 14 days then stop), starting 7/18/2023, trial fluoxetine 10mg daily until 8/07/2023 then increase to 20mg daily; writer monitoring for efficacy and tolerability, may target 30mg to 40mg once he completes venlafaxine taper), continue lorazepam 0.5mg daily PRN (has, takes infrequently), trazodone 50mg at bed PRN (takes 2-3x weekly to reduce anxious rumination, has)    - reviewed writer's conservative prescribing style, agreed to fill up to #60 lorazepam per year as needed or #5/ month, no RF unless needed)    2) sees therapist later this week     RTC: two then three months, sooner as needed    CRISIS NUMBERS:   Provided routinely in AVS.    Treatment Risk Statement:  The patient understands the risks, benefits, adverse effects and alternatives. Agrees to treatment with the capacity to do so. No medical contraindications to treatment. Agrees to call clinic for any problems. The patient understands to call 911 or go to the nearest ED if life threatening or urgent symptoms occur.     WHODAS 2.0  TODAY total score = N/A; [a 12-item WHODAS 2.0 assessment was not completed by the pt today and/or recorded in EPIC].    PROVIDER:  JENNIFER Alvarez CNP

## 2023-07-17 NOTE — PATIENT INSTRUCTIONS
**For crisis resources, please see the information at the end of this document**   Patient Education    Thank you for coming to the [facility].     Lab Testing:  If you had lab testing today and your results are reassuring or normal they will be mailed to you or sent through eCaring within 7 days. If the lab tests need quick action we will call you with the results. The phone number we will call with results is # [phone]. If this is not the best number please call our clinic and change the number.     Medication Refills:  If you need any refills please call your pharmacy and they will contact us. Our fax number for refills is [phone].   Three business days of notice are needed for general medication refill requests.   Five business days of notice are needed for controlled substance refill requests.   If you need to change to a different pharmacy, please contact the new pharmacy directly. The new pharmacy will help you get your medications transferred.     Contact Us:  Please call [phone] during business hours (8-5:00 M-F).   If you have medication related questions after clinic hours, or on the weekend, please call [phone].     Financial Assistance [phone]   Medical Records [phone]       MENTAL HEALTH CRISIS RESOURCES:  For a emergency help, please call 911 or go to the nearest Emergency Department.     Emergency Walk-In Options:   EmPATH Unit @ [facility] ([place]): [phone] - Specialized mental health emergency area designed to be calming  [facility] ([place]): [phone]  [facility] ([place]): [phone]  [facility]): [phone]    [facility] Crisis Information:   [place]: [phone]  [place]: [phone]  [place] ([place]) - Adult: [phone]     Child: [phone]  [place] - Adult: [phone]     Child: [phone]  [place]:  [phone]  List of all [facility] resources:   https://[place].gov/dhs/people-we-serve/adults/health-care/mental-health/resources/crisis-contacts.jsp    National Crisis Information:   Crisis Text Line: Text  [place]  to 730697  Suicide & Crisis Lifeline: 988  National Suicide Prevention Lifeline: 9-127-741-TALK (1-456.381.2521)       For online chat options, visit https://suicidepreventionlifeline.org/chat/  Poison Control Center: 0-176-539-5202  Trans Lifeline: 6-236-311-8739 - Hotline for transgender people of all ages  The [employer] Project: 5-127-852-1340 - Hotline for LGBT youth     For Non-Emergency Support:   Fast Tracker: Mental Health & Substance Use Disorder Resources -   https://www.[employer]Wire[employer]loren.org/

## 2023-07-17 NOTE — NURSING NOTE
Is the patient currently in the state of [place]? YES    Visit mode:VIDEO    If the visit is dropped, the patient can be reconnected by: VIDEO VISIT: Text to cell phone: [phone]    Will anyone else be joining the visit? NO      How would you like to obtain your AVS? MyChart    Are changes needed to the allergy or medication list? NO    Reason for visit: MARIANN Keating on 7/17/2023 at 9:05 AM

## 2023-07-21 ENCOUNTER — VIRTUAL VISIT (OUTPATIENT)
Dept: PSYCHIATRY | Facility: CLINIC | Age: 23
End: 2023-07-21
Attending: PSYCHOLOGIST
Payer: COMMERCIAL

## 2023-07-21 DIAGNOSIS — F45.21 ILLNESS ANXIETY DISORDER: Primary | ICD-10-CM

## 2023-07-21 DIAGNOSIS — F40.01 PANIC DISORDER WITH AGORAPHOBIA: ICD-10-CM

## 2023-07-21 PROCEDURE — 90837 PSYTX W PT 60 MINUTES: CPT | Mod: U7

## 2023-07-21 NOTE — PROGRESS NOTES
"Video- Visit Details  Type of service:  video visit for mental health treatment.  Time of service:  Date:  7/21/2022  Video Start Time:  11:10 AM        Video End Time: 12:05 PM    Reason for video visit: Client preference  Originating Site (patient location): A friend's house in [place]  Distant Site (provider location):  Provider's home office  Mode of Communication:  Video Conference via [facility]      Consent: This virtual video visit will be conducted via a video call between the client and his therapist. This service lets us provide the care the patient needs with a video conversation. If during the course of the video call the provider feels a video visit is not appropriate, the patient will not be charged for this service. The patient has verbally consented to: the potential risks and benefits of telemedicine (video) versus in person care; bill my insurance or make self-payment for services provided; and responsibility for payment of non-covered services.     How would the patient like to obtain the AVS?  Martha    As the provider I attest to compliance with applicable laws and regulations related to telemedicine.    OUTPATIENT PSYCHOTHERAPY PROGRESS NOTE    Client Name: Barrett Bazan   YOB: 2000 (22 year old)   Service Type(s): 12860 psychotherapy (55 min. with patient)    Diagnoses:   Encounter Diagnosis   Name Primary?      Illness Anxiety Disorder (H)  Panic Disorder with agoraphobia Yes  Yes       Individuals Present:   Client attended alone    Treatment goal(s) being addressed:  1) Decreasing the frequency and intensity of ruminative thoughts      Subjective:  -Barrett is enjoying his time off. Appreciates being away from school and the stressors.  -He is going  through a medication switch and is anxious about having a \"serotonin drop\" like one he had before when switched medications.  -He recently heard the news of a friend's mom passing because of a \"brain aneurysm\" and this has been a " "preoccupation since.  -He is worried the combination of medication switch and the anxiety around his health makes transitioning back to school difficult.  -He is back in [place] every two weeks to hang out with friends and be prepared to go back to school in September.  -His mood is \"decent\" and he described anxiety as \"ramping up\". He denied SI/SIB/HI/AVH.    Situation to practice: Watching an illustrative animation of how a heart attack develops and discussion on it.      Treatment:  Cognitive Behavioral therapy     Assessment and Progress:   Barrett is a 22 year-old with history of depression and anxiety who is referred by psychiatrist for supportive psychotherapy with the working diagnosis of depressive disorder in remission. In our sessions he was found to have ruminative thoughts about fatal illnesses such as heart attack, aneurysm which leads to avoidance behavior and trigger anxiety/panic attacks. Barrett was found to benefit from a 12 week course of CBT in the format of EPR.    Today he is back to therapy after three months to work on his ruminative thoughts and avoidance behavior through CBT. Today we identified the thoughts and the emotional components attached to them. The unpredictability, and how devastating the consequences of having serious fatal illnesses were discussed. He described how the word \"aneurysm\" has power to destruct his life. We plan to meet on a Bi weekly basis to continue working on the treatment goals.     Plan:   We will Client is going to work on [10/100] exposures and documenting the level of difficulty of the exposures each time. Next therapy appointment has been scheduled in a week to continue work on treatment goals.    Treatment Plan          SYMPTOMS; PROBLEMS   MEASURABLE GOALS;    FUNCTIONAL IMPROVEMENT INTERVENTIONS;   GAINS MADE DISCHARGE CRITERIA   Ruminative thoughts related to fatal illnesses   Decrease in number of hours preoccupied by ruminative thoughts medications "   psycho-education   psychotherapy  relaxation techniques  marked symptom improvement and reduced visit frequency     Date of most recent treatment plan: 7/21/2023  Date next treatment plan due: 3 months    Patient reported changes to current medication list and has been directed to notify the appropriate medical provider    Psychotherapy services during this visit included myself and the patient. Patient agreed with treatment plan. Discussed case with supervisor who also agreed with the treatment plan. Unable to sign in person due to visit being conducted through telehealth.     Aguilar Mccracken MD  Psychiatry Resident    Case discussed with attending therapist who was not present in person.   Answers submitted by the patient for this visit:  Patient Health Questionnaire (Submitted on 7/21/2023)  If you checked off any problems, how difficult have these problems made it for you to do your work, take care of things at home, or get along with other people?: Somewhat difficult  PHQ9 TOTAL SCORE: 4

## 2023-08-01 ENCOUNTER — MYC MEDICAL ADVICE (OUTPATIENT)
Dept: PSYCHIATRY | Facility: CLINIC | Age: 23
End: 2023-08-01
Payer: COMMERCIAL

## 2023-08-02 NOTE — CONFIDENTIAL NOTE
Lubna Whitfield, APRN CNP  You; Lori Brooks, RN; Kayla Valentin, RP hour ago (12:14 PM)     AJ  Yes, that was the exact plan. Have him do as Kayla recommends. Does he have a PCP to get a lab or vit D level in the last six months? We'll start with a level if not.     Kayla Valentin, [facility]  You; Bobbi Campos, NATE; Emy Scott RN21 hours ago (3:59 PM)     FREDDY amos,    I interpret Lubna's note similar to Lori - it looks like patient should have started fluoxetine 10mg daily when he decreased to Effexor 75mg daily. I think it would be reasonable for him to start fluoxetine 10mg daily as adding it can help reduce discontinuation symptoms from Effexor. I think it would be reasonable for him to continue Effexor 75mg daily for 1 more week with the addition of fluoxetine 10mg daily then try decreasing Effexor to 37.5mg.    Thank you,    Kayla

## 2023-09-10 ASSESSMENT — PATIENT HEALTH QUESTIONNAIRE - PHQ9
SUM OF ALL RESPONSES TO PHQ QUESTIONS 1-9: 2
10. IF YOU CHECKED OFF ANY PROBLEMS, HOW DIFFICULT HAVE THESE PROBLEMS MADE IT FOR YOU TO DO YOUR WORK, TAKE CARE OF THINGS AT HOME, OR GET ALONG WITH OTHER PEOPLE: SOMEWHAT DIFFICULT
SUM OF ALL RESPONSES TO PHQ QUESTIONS 1-9: 2

## 2023-09-11 ENCOUNTER — VIRTUAL VISIT (OUTPATIENT)
Dept: PSYCHIATRY | Facility: CLINIC | Age: 23
End: 2023-09-11
Attending: NURSE PRACTITIONER
Payer: COMMERCIAL

## 2023-09-11 DIAGNOSIS — F41.1 GAD (GENERALIZED ANXIETY DISORDER): Primary | ICD-10-CM

## 2023-09-11 PROCEDURE — 99214 OFFICE O/P EST MOD 30 MIN: CPT | Mod: VID | Performed by: NURSE PRACTITIONER

## 2023-09-11 NOTE — PATIENT INSTRUCTIONS
**For crisis resources, please see the information at the end of this document**   Patient Education    Thank you for coming to the [facility] [place] [facility].     Lab Testing:  If you had lab testing today and your results are reassuring or normal they will be mailed to you or sent through [employer] within 7 days. If the lab tests need quick action we will call you with the results. The phone number we will call with results is # [phone]. If this is not the best number please call our clinic and change the number.     Medication Refills:  If you need any refills please call your pharmacy and they will contact us. Our fax number for refills is [phone].   Three business days of notice are needed for general medication refill requests.   Five business days of notice are needed for controlled substance refill requests.   If you need to change to a different pharmacy, please contact the new pharmacy directly. The new pharmacy will help you get your medications transferred.     Contact Us:  Please call [phone] during business hours (8-5:00 M-F).   If you have medication related questions after clinic hours, or on the weekend, please call [phone].     Financial Assistance [phone]   Medical Records [phone]       MENTAL HEALTH CRISIS RESOURCES:  For a emergency help, please call 911 or go to the nearest Emergency Department.     Emergency Walk-In Options:   EmPATH Unit @ [facility] ([place]): [phone] - Specialized mental health emergency area designed to be calming  [facility] ([place]): [phone]  [facility] ([place]): [phone]  [facility]): [phone]    [facility] Crisis Information:   [place]: [phone]  [place]: [phone]  [place] ([place]) - Adult: [phone]     Child: [phone]  [place] - Adult: [phone]     Child: [phone]  [place]:  [phone]  List of all [facility] resources:   https://[place].gov/dhs/people-we-serve/adults/health-care/mental-health/resources/crisis-contacts.jsp    National Crisis Information:   Crisis Text Line: Text  [place]  to 844810  Suicide & Crisis Lifeline: 988  National Suicide Prevention Lifeline: 8-694-059-TALK (1-566.283.7364)       For online chat options, visit https://suicidepreventionlifeline.org/chat/  Poison Control Center: 8-211-617-3087  Trans Lifeline: 1-239-573-2957 - Hotline for transgender people of all ages  The [employer]: 2-705-907-8760 - Hotline for LGBT youth     For Non-Emergency Support:   Fast Tracker: Mental Health & Substance Use Disorder Resources -   https://www.[employer]OrderBordern.org/

## 2023-09-11 NOTE — NURSING NOTE
Is the patient currently in the state of [place]? YES    Visit mode:VIDEO    If the visit is dropped, the patient can be reconnected by: VIDEO VISIT: Text to cell phone:   Telephone Information:   Mobile [phone]       Will anyone else be joining the visit? NO  (If patient encounters technical issues they should call [phone])    How would you like to obtain your AVS? MyChart    Are changes needed to the allergy or medication list? No    Reason for visit: RECHECK    Twila BEGUM

## 2023-09-11 NOTE — PROGRESS NOTES
"Virtual Visit Details    Type of service:  Video Visit     Originating Location (pt. Location): Home  Distant Location (provider location):  Off-site  Platform used for Video Visit: [facility]  Psychiatry Clinic  PSYCHIATRIC PROGRESS NOTE       Barrett Bazan is a 23 year old male who prefers the name Barrett and pronouns he, him.  Therapist: established with Dr. Nawaf Abbott  PCP: Eileen Andrew  Other Providers: followed by Dr Andrew in [place] as PCP    PREVIOUS PSYCH MED TRIALS:  - Lexapro 20mg (1st trial, \"it fixed all my problems with depression completely then the anxiety started)  - Vyvanse 20mg (activated anxiety)  - olanzapine zydis 5mg (given in 10/2021 ER note for marijuana abuse)  - trazodone 50mg  - Zoloft (ineffective, titrated down \"too fast, really bad panic attack\")  - Adderall, hydroxyzine trials referred to in chart    Pertinent Background:  See previous notes.  Psych critical item history includes one week admit in [place] in 2019, history of active SI with plan to shoot himself in 2019,     Interim History         The patient is a fair historian and reports good treatment adherence.    Last seen on 4/19/2023 when he chose to continue venlafaxine taper from 112.5mg to 75mg QAM, trial fluoxetine 10mg daily, continue lorazepam 0.5mg daily PRN, trazodone 50mg at bed PRN     Between visits, he chose to taper off venlafaxine (Aug 30th)and continue fluoxetine 10mg daily.     Assessed in ER in Oct 2021 for marijuana abuse, in Feb 2022 for palpitations (unremarkable EKG, 450 QTc), in June 2022 for anxiety.    He presents alone, his Mom was not present.    Since the last visit, he's been sick and \"could be better. Still adjusting to being back to college\".   - he's off venlafaxine, brain zaps continue \"every handful of days\"  - with fluoxetine decreased to 10mg about Aug 30th; he feels \"worse\", , he finds his progress \"hard to tell, I've been sick, still adjusting to " "college. The throat irritation ceased- maybe it was allergies? Less extremes but serotonin is still too low\"  - he limits lorazepam, none in the last 10 days  - limiting trazodone 50mg 3-4x weekly  - re: therapy, he's planning to reschedule  - he's a senior at [employer], hopes to study abroad in spring, majoring in global studies  - enjoys friends, going out to the bar, exercise  - support from his Mom and therapist, he often prefers to keep things private from friends  - coping skills are improving and include seeking support, face the anxiety and neutralize it, paced breathing, self affirm, self talk, practicing gratitude, drinking water, eating, journaling    Recent Symptoms:   Depression:  PHQ 2 on Sept 10, endorses few days of trouble sleeping, varied appetite  Anxiety: anxiety is \"definitely worse on fluoxetine 10mg, it reacts first in my body and then in my mind but sometimes it reverses\"; very anxious about college, illness; notes dyspnea, tingling in his arms, ruminating thoughts  - transitions/ stress increase anxiety, finals, feeling trapped in a long car ride, visiting extended family or somewhere new is hard  - Mom Is treated for OCD, she observes obsessive thought pattern in [facility]; per chart, history of \"obsessive negative thoughts\"    ADVERSE EFFECTS: none  MEDICAL CONCERNS: none today    APPETITE: varied appetite, notices his blood sugar drops if he skips meals, 175# in Feb 2022  SLEEP: taking trazodone 50mg 3-4x weekly, sleeping 6-8 hours      Recent Substance Use:  Alcohol- limits to drinking once since return to college  - aware anxiety increases when he's been drinking or hung over  - drinking socially, more on campus than at home  - when he drinks socially, he previously reported drinking 3 drinks or even 10 drinks over 5-6 hours  - denies passing or blacking out  - prefers beer and mixed drinks    Caffeine- one cup coffee daily without elevated HR, dislikes soda and energy drinks   Cannabis- none    "     Social/ Family History            FINANCIAL SUPPORT-  college student at [employer]        CHILDREN- None       LIVING SITUATION- lives in his own room in the frat house   LEGAL- None  EARLY HISTORY/ EDUCATION- born and raised in [place], born to  parents. Sister Dalia (b. ). Current [employer] student. He spends summers at home in [place].  SOCIAL/ SPIRITUAL SUPPORT- social support from Mom, therapist; identifies as not [employer]       CULTURAL INFLUENCES/ IMPACT- none       TRAUMA HISTORY- Mom perceives snow boarding accident in  as medical trauma  FEELS SAFE AT HOME- Yes   FAMILY PSYCH HISTORY: diffuse depression on maternal side; Mom- treated for OCD with fluvoxamine effectively, MGM- depression, MGF-  by suicide (shooting)    Medical / Surgical History                                 Patient Active Problem List   Diagnosis    Depression, major, in remission (H)    Panic disorder without agoraphobia       No past surgical history on file.     Medical Review of Systems              A comprehensive review of systems was performed and is negative other than noted in the HPI.    Snowboarding accident in  (torn labrum); denies TBI, LOC. Denies seizures.     Allergy    Amoxicillin-pot clavulanate, Oxycodone, and Doxycycline  Current Medications        Current Outpatient Medications   Medication Sig Dispense Refill    FLUoxetine (PROZAC) 10 MG capsule Take 10mg daily then increase to 20mg daily on Aug 7, 2023 30 capsule 2    LORazepam (ATIVAN) 0.5 MG tablet Take one half to one (0.5 - 1) tab daily as needed for anxiety 5 tablet 0    FLUoxetine (PROZAC) 20 MG capsule Take 1 capsule (20 mg) by mouth daily (Patient not taking: Reported on 2023) 30 capsule 1    venlafaxine (EFFEXOR XR) 37.5 MG 24 hr capsule Take 1 capsule (37.5 mg) by mouth daily (Patient not taking: Reported on 2023) 30 capsule 1     Vitals         [3, 3]   There were no vitals taken for this visit.   Mental Status Exam          Alertness:  alert  and oriented  Appearance: adequately groomed  Behavior/Demeanor: cooperative, pleasant and calm, with good  eye contact   Speech: normal and regular rate and rhythm  Language: no problems  Psychomotor: normal or unremarkable  Mood: anxious  Affect: restricted and appropriate; was congruent to mood; was congruent to content  Thought Process/Associations: unremarkable  Thought Content:  Reports none;  Denies suicidal and violent ideation, delusions, preoccupations, obsessions , phobia , magical thinking, over-valued ideas and paranoid ideation  Perception:  Reports none;  Denies auditory hallucinations, visual hallucinations, visual distortion seen as shadows , depersonalization and derealization  Insight: fair  Judgment: adequate for safety  Cognition: appears grossly intact; formal cognitive testing was not done  Gait/Station and/or Muscle Strength/Tone:  N/A    Labs and Data                          Rating Scales:      PHQ9 Today:        7/17/2023     9:00 AM 7/21/2023    11:02 AM 9/10/2023     3:13 PM   PHQ   PHQ-9 Total Score 4 4 2   Q9: Thoughts of better off dead/self-harm past 2 weeks Not at all Not at all Not at all     Diagnosis      Impression includes recurrent, moderate MDD in partial remission and GAVIN responding to meds and therapy  - per patient, previous diagnosis of ADHD is managed with behavioral skills as stimulant was activating  - per Mom, medical trauma from snowboarding accident in 2022     Assessment         TODAY, the following items were reviewed:     ([place] & [place]): 02/2023- lorazepam 0.5mg #30 last filled 8/22/2022    PSYCHOTROPIC DRUG INTERACTIONS: none with Effexor, Ativan    Drug Interaction Management: Monitoring for adverse effects, routine vitals, using lowest therapeutic dose of [psychotropics] and patient is aware of risks    Plan                                                                                                                     1) discussed options, risks,  benefits including paradoxical risk, taking time to make a decision while he's dealing with increased stress during transitions like return to college, importance of sleep hygiene skills to improve sleep, sobriety for meds and therapy to be most effective, his willingness to be forthcoming with writer and therapist, research showing 50% improved outcomes with meds and therapy, his Mom's OCD diagnosis and potential neuropsych eval options to find his most accurate diagnosis, treatment options including fluoxetine 15mg daily, history of effective Lexapro trial, utility of Propranolol PRN for physical symptoms of anxiety, today, he chooses to consider options, talk with his parents and message writer, and for now, he chooses to continue fluoxetine 10mg daily, continue lorazepam 0.5mg daily PRN (has), trazodone 50mg at bed PRN (has)    - reviewed writer's conservative prescribing style, agreed to fill up to #60 lorazepam per year as needed or #5/ month, no RF unless needed)    2) rescheduling with therapist    RTC: 4-8 weeks, sooner as needed    CRISIS NUMBERS:   Provided routinely in AVS.    Treatment Risk Statement:  The patient understands the risks, benefits, adverse effects and alternatives. Agrees to treatment with the capacity to do so. No medical contraindications to treatment. Agrees to call clinic for any problems. The patient understands to call 911 or go to the nearest ED if life threatening or urgent symptoms occur.     WHODAS 2.0  TODAY total score = N/A; [a 12-item WHODAS 2.0 assessment was not completed by the pt today and/or recorded in EPIC].    PROVIDER:  JENNIFER Alvarez CNP

## 2023-10-06 ENCOUNTER — MYC MEDICAL ADVICE (OUTPATIENT)
Dept: PSYCHIATRY | Facility: CLINIC | Age: 23
End: 2023-10-06
Payer: COMMERCIAL

## 2023-10-06 DIAGNOSIS — F41.1 GAD (GENERALIZED ANXIETY DISORDER): ICD-10-CM

## 2023-10-06 DIAGNOSIS — F32.5 DEPRESSION, MAJOR, IN REMISSION (H): ICD-10-CM

## 2023-10-06 RX ORDER — FLUOXETINE 10 MG/1
CAPSULE ORAL
Qty: 15 CAPSULE | Refills: 0 | Status: SHIPPED | OUTPATIENT
Start: 2023-10-06 | End: 2023-10-11

## 2023-10-11 ENCOUNTER — VIRTUAL VISIT (OUTPATIENT)
Dept: PSYCHIATRY | Facility: CLINIC | Age: 23
End: 2023-10-11
Attending: NURSE PRACTITIONER
Payer: COMMERCIAL

## 2023-10-11 DIAGNOSIS — F32.5 DEPRESSION, MAJOR, IN REMISSION (H): ICD-10-CM

## 2023-10-11 DIAGNOSIS — F41.1 GAD (GENERALIZED ANXIETY DISORDER): ICD-10-CM

## 2023-10-11 PROCEDURE — 99214 OFFICE O/P EST MOD 30 MIN: CPT | Mod: VID | Performed by: NURSE PRACTITIONER

## 2023-10-11 RX ORDER — FLUOXETINE 10 MG/1
CAPSULE ORAL
Qty: 30 CAPSULE | Refills: 1 | Status: SHIPPED | OUTPATIENT
Start: 2023-10-11 | End: 2023-10-16

## 2023-10-11 ASSESSMENT — PATIENT HEALTH QUESTIONNAIRE - PHQ9
SUM OF ALL RESPONSES TO PHQ QUESTIONS 1-9: 3
SUM OF ALL RESPONSES TO PHQ QUESTIONS 1-9: 3
10. IF YOU CHECKED OFF ANY PROBLEMS, HOW DIFFICULT HAVE THESE PROBLEMS MADE IT FOR YOU TO DO YOUR WORK, TAKE CARE OF THINGS AT HOME, OR GET ALONG WITH OTHER PEOPLE: SOMEWHAT DIFFICULT

## 2023-10-11 NOTE — PROGRESS NOTES
"Virtual Visit Details    Type of service:  Video Visit     Originating Location (pt. Location): Home  Distant Location (provider location):  Off-site  Platform used for Video Visit: [facility]  Psychiatry Clinic  PSYCHIATRIC PROGRESS NOTE       Barrett Bazan is a 23 year old male who prefers the name Barrett and pronouns he, him.  Therapist: established with Dr. Nawfa Abbott  PCP: Eileen Andrew  Other Providers: followed by Dr Andrew in [place] as PCP    PREVIOUS PSYCH MED TRIALS:  - Lexapro 20mg (1st trial, \"it fixed all my problems with depression completely then the anxiety started)  - Vyvanse 20mg (activated anxiety)  - olanzapine zydis 5mg (given in 10/2021 ER note for marijuana abuse)  - trazodone 50mg  - Zoloft (ineffective, titrated down \"too fast, really bad panic attack\")  - Adderall, hydroxyzine trials referred to in chart    Pertinent Background:  See previous notes.  Psych critical item history includes one week admit in [place] in 2019, history of active SI with plan to shoot himself in 2019,     Interim History         The patient is a fair historian and reports good treatment adherence.    Last seen on 9/11/2023 when he chose to continue fluoxetine 10mg daily, lorazepam 0.5mg daily PRN, trazodone 50mg at bed PRN.     Assessed in ER in Oct 2021 for marijuana abuse, in Feb 2022 for palpitations (unremarkable EKG, 450 QTc), in June 2022 for anxiety.    He presents alone, his Mom was not present.    Since the last visit, he's been alright.   - no brain zaps \"in awhile\"  - no memory of the last time he took lorazepam  - discussed importance of seeing one speciality provider for refills  - with routine, sound sleep schedule, no need for trazodone  - between visits, he chose to continue fluoxetine 10mg, wonders about congestion when he goes to bed and not while awake  - he's transitioned back to college, he's preparing for mid terms  - functioning well at home and " "college  - rescheduled with therapy  - as a [employer] senior, he'll study abroad in spring (mid Jan - Apr), majoring in global studies  - enjoys friends, going out to the bar, exercise  - support from his Mom and therapist, he often prefers to keep things private from friends  - coping skills include seeking support, face the anxiety and neutralize it, paced breathing, self affirm, self talk, practicing gratitude, drinking water, eating, journaling    Recent Symptoms:   Depression:  PHQ 3, few days of trouble sleeping, low energy, trouble concentrating   Anxiety: improved, feeling more stable with fluoxetine, therapy and work with his transition to college, no tingling in his arms    - transitions/ stress increase anxiety, finals, feeling trapped in a long car ride, visiting extended family or somewhere new is hard  - Mom Is treated for OCD, she observes obsessive thought pattern in [facility]; per chart, history of \"obsessive negative thoughts\"    ADVERSE EFFECTS: none  MEDICAL CONCERNS: none today    APPETITE: OK, notices his blood sugar drops if he skips meals, 175# in Feb 2022  SLEEP: no need for trazodone with focused sleep hygiene, sleeping 6-8 hours      Recent Substance Use:  Alcohol- limits drinking to 2x in the last 6 weeks   - might drink for \"relaxation, R & R\"  - aware anxiety increases when he's been drinking excessively or hung over  - drinking socially, more on campus than at home  - previously reported drinking 3 - 10 drinks over 5-6 hours  - denies passing or blacking out  - prefers beer and mixed drinks    Caffeine- one cup coffee daily without elevated HR, dislikes soda and energy drinks   Cannabis- none        Social/ Family History            FINANCIAL SUPPORT-  college student at [employer]        CHILDREN- None       LIVING SITUATION- lives in his own room in the [facility]   LEGAL- None  EARLY HISTORY/ EDUCATION- born and raised in [place], born to  parents. Sister Dalia (b. 2004). Current [employer] student. He " spends summers at home in [place].  SOCIAL/ SPIRITUAL SUPPORT- social support from Mom, therapist; identifies as not [employer]       CULTURAL INFLUENCES/ IMPACT- none       TRAUMA HISTORY- Mom perceives snow boarding accident in  as medical trauma  FEELS SAFE AT HOME- Yes   FAMILY PSYCH HISTORY: diffuse depression on maternal side; Mom- treated for OCD with fluvoxamine effectively, MGM- depression, MGF-  by suicide (shooting)    Medical / Surgical History                                 Patient Active Problem List   Diagnosis    Depression, major, in remission (H24)    Panic disorder without agoraphobia       No past surgical history on file.     Medical Review of Systems              A comprehensive review of systems was performed and is negative other than noted in the HPI.    Snowboarding accident in  (torn labrum); denies TBI, LOC. Denies seizures.     Allergy    Amoxicillin-pot clavulanate, Oxycodone, and Doxycycline  Current Medications        Current Outpatient Medications   Medication Sig Dispense Refill    FLUoxetine (PROZAC) 10 MG capsule Take 10mg cap daily until visit on Oct 11, 2023 15 capsule 0    LORazepam (ATIVAN) 0.5 MG tablet Take one half to one (0.5 - 1) tab daily as needed for anxiety 5 tablet 0     Vitals         [3, 3]   There were no vitals taken for this visit.   Mental Status Exam          Alertness: alert  and oriented  Appearance: adequately groomed  Behavior/Demeanor: cooperative, pleasant and calm, with good  eye contact   Speech: normal and regular rate and rhythm  Language: no problems  Psychomotor: normal or unremarkable  Mood: anxious  Affect: restricted and appropriate; was congruent to mood; was congruent to content  Thought Process/Associations: unremarkable  Thought Content:  Reports none;  Denies suicidal and violent ideation, delusions, preoccupations, obsessions , phobia , magical thinking, over-valued ideas and paranoid ideation  Perception:  Reports none;  Denies  auditory hallucinations, visual hallucinations, visual distortion seen as shadows , depersonalization and derealization  Insight: fair  Judgment: adequate for safety  Cognition: appears grossly intact; formal cognitive testing was not done  Gait/Station and/or Muscle Strength/Tone:  N/A    Labs and Data                          Rating Scales:      Answers submitted by the patient for this visit:  Patient Health Questionnaire (Submitted on 10/11/2023)  If you checked off any problems, how difficult have these problems made it for you to do your work, take care of things at home, or get along with other people?: Somewhat difficult  PHQ9 TOTAL SCORE: 3    PHQ9 Today:        7/21/2023    11:02 AM 9/10/2023     3:13 PM 10/11/2023     9:56 AM   PHQ   PHQ-9 Total Score 4 2 3   Q9: Thoughts of better off dead/self-harm past 2 weeks Not at all Not at all Not at all     Diagnosis      Impression includes recurrent, moderate MDD in partial remission and GAVIN responding to meds and therapy  - per patient, previous diagnosis of ADHD is managed with behavioral skills as stimulant was activating  - per Mom, medical trauma from snowboarding accident in 2022     Assessment         TODAY, the following items were reviewed:     ([place] & [place]): 10/2023    PSYCHOTROPIC DRUG INTERACTIONS: none with Effexor, Ativan    Drug Interaction Management: Monitoring for adverse effects, routine vitals, using lowest therapeutic dose of [psychotropics] and patient is aware of risks    Plan                                                                                                                     1) discussed options, risks, benefits including paradoxical risk, potential utility of Lexapro, important transitions like return to college and studying abroad, importance of sleep hygiene skills, sobriety for meds and therapy to be most effective, his willingness to be forthcoming with writer and therapist, research showing 50% improved outcomes with  meds and therapy, his Mom's OCD diagnosis, utility of Propranolol PRN for physical symptoms of anxiety, today, he chooses to continue fluoxetine 10mg daily, lorazepam 0.5mg daily PRN (has), trazodone 50mg at bed PRN (has)  - he will talk to pharmacy about RFs while he studies abroad  - reviewed writer's conservative prescribing style, agreed to fill up to #60 lorazepam per year as needed or #5/ month, no RF unless needed)    2) rescheduling with therapist    RTC: 6 weeks, sooner as needed    CRISIS NUMBERS:   Provided routinely in AVS.    Treatment Risk Statement:  The patient understands the risks, benefits, adverse effects and alternatives. Agrees to treatment with the capacity to do so. No medical contraindications to treatment. Agrees to call clinic for any problems. The patient understands to call 911 or go to the nearest ED if life threatening or urgent symptoms occur.     WHODAS 2.0  TODAY total score = N/A; [a 12-item WHODAS 2.0 assessment was not completed by the pt today and/or recorded in EPIC].    PROVIDER:  JENNIFER Alvarez CNP

## 2023-10-11 NOTE — PATIENT INSTRUCTIONS
**For crisis resources, please see the information at the end of this document**   Patient Education    Thank you for coming to the [facility] [place] [facility].     Lab Testing:  If you had lab testing today and your results are reassuring or normal they will be mailed to you or sent through [place] within 7 days. If the lab tests need quick action we will call you with the results. The phone number we will call with results is # [phone]. If this is not the best number please call our clinic and change the number.     Medication Refills:  If you need any refills please call your pharmacy and they will contact us. Our fax number for refills is [phone].   Three business days of notice are needed for general medication refill requests.   Five business days of notice are needed for controlled substance refill requests.   If you need to change to a different pharmacy, please contact the new pharmacy directly. The new pharmacy will help you get your medications transferred.     Contact Us:  Please call [phone] during business hours (8-5:00 M-F).   If you have medication related questions after clinic hours, or on the weekend, please call [phone].     Financial Assistance [phone]   Medical Records [phone]       MENTAL HEALTH CRISIS RESOURCES:  For a emergency help, please call 911 or go to the nearest Emergency Department.     Emergency Walk-In Options:   EmPATH Unit @ [facility] ([place]): [phone] - Specialized mental health emergency area designed to be calming  [facility] ([place]): [phone]  [facility] ([place]): [phone]  [facility]): [phone]    [facility] Crisis Information:   [place]: [phone]  [place]: [phone]  [place] ([place]) - Adult: [phone]     Child: [phone]  [place] - Adult: [phone]     Child: [phone]  [place]:  [phone]  List of all [facility] resources:   https://[place].gov/dhs/people-we-serve/adults/health-care/mental-health/resources/crisis-contacts.jsp    National Crisis Information:   Crisis Text Line: Text  [place]  to 030657  Suicide & Crisis Lifeline: 988  National Suicide Prevention Lifeline: 8-635-300-TALK (1-429.515.2545)       For online chat options, visit https://suicidepreventionlifeline.org/chat/  Poison Control Center: 8-314-316-3900  Trans Lifeline: 8-231-913-0628 - Hotline for transgender people of all ages  The Minh Project: 3-502-401-9620 - Hotline for LGBT youth     For Non-Emergency Support:   Fast Tracker: Mental Health & Substance Use Disorder Resources -   https://www.Capsule.fmckBoontyn.org/

## 2023-10-11 NOTE — NURSING NOTE
Is the patient currently in the state of [place]? YES    Visit mode:VIDEO    If the visit is dropped, the patient can be reconnected by: VIDEO VISIT: Text to cell phone:   Telephone Information:   Mobile [phone]       Will anyone else be joining the visit? NO  (If patient encounters technical issues they should call [phone])    How would you like to obtain your AVS? MyChart    Are changes needed to the allergy or medication list? Pt stated no changes to allergies and Pt stated no med changes    Reason for visit: FLO BEGUM

## 2023-10-16 DIAGNOSIS — F32.5 DEPRESSION, MAJOR, IN REMISSION (H): ICD-10-CM

## 2023-10-16 DIAGNOSIS — F41.1 GAD (GENERALIZED ANXIETY DISORDER): ICD-10-CM

## 2023-10-16 RX ORDER — FLUOXETINE 10 MG/1
CAPSULE ORAL
Qty: 90 CAPSULE | Refills: 0 | Status: SHIPPED | OUTPATIENT
Start: 2023-10-16 | End: 2023-12-06

## 2023-10-16 NOTE — TELEPHONE ENCOUNTER
[place],    Do you want a 90 days request for the Fluoxetine 10 mg ? Or keep it at 30 days supply?     If approve, please sign script.          Janett Ward on 10/16/2023 at 9:01 AM

## 2023-10-20 ENCOUNTER — VIRTUAL VISIT (OUTPATIENT)
Dept: PSYCHIATRY | Facility: CLINIC | Age: 23
End: 2023-10-20
Attending: PSYCHIATRY & NEUROLOGY
Payer: COMMERCIAL

## 2023-10-20 DIAGNOSIS — F32.5 DEPRESSION, MAJOR, IN REMISSION (H): ICD-10-CM

## 2023-10-20 DIAGNOSIS — F40.01 PANIC DISORDER WITH AGORAPHOBIA: ICD-10-CM

## 2023-10-20 DIAGNOSIS — F41.8 ANXIETY WITH SOMATIC FEATURES: Primary | ICD-10-CM

## 2023-10-20 PROCEDURE — 90837 PSYTX W PT 60 MINUTES: CPT | Mod: U7

## 2023-10-20 NOTE — PROGRESS NOTES
Video- Visit Details  Type of service:  video visit for mental health treatment.  Time of service:  Date:  10/20/2023  Video Start Time:  9:17 AM        Video End Time: 10:10 AM    Reason for video visit: Client preference  Originating Site (patient location): [facility]  Distant Site (provider location):  Provider's home office  Mode of Communication:  Video Conference via AmWell      Consent: This virtual video visit will be conducted via a video call between the client and his therapist. This service lets us provide the care the patient needs with a video conversation. If during the course of the video call the provider feels a video visit is not appropriate, the patient will not be charged for this service. The patient has verbally consented to: the potential risks and benefits of telemedicine (video) versus in person care; bill my insurance or make self-payment for services provided; and responsibility for payment of non-covered services.     How would the patient like to obtain the AVS?  Martha    As the provider I attest to compliance with applicable laws and regulations related to telemedicine.    OUTPATIENT PSYCHOTHERAPY PROGRESS NOTE    Client Name: Barrett Bazan   YOB: 2000 (22 year old)   Service Type(s): 63639 psychotherapy (53 min. with patient)    Diagnoses:   Encounter Diagnosis   Name Primary?      Illness Anxiety Disorder (H)  Panic Disorder with agoraphobia Yes  Yes       Individuals Present:   Client attended alone    Treatment goal(s) being addressed:  1) Decoupling the physiologic bodily sensations and the ruminative thoughts of a fatal illness      Subjective:  -The transition to the senior year has been smooth. Two weeks ago had a stressful week.  -He is planning to spend a few month in [place] starting this January.  -He has switched to Fluoxetine and is happy with the effects. No side effects. Using trazodone more consistently during the switch. Now sleeps well.  -Mood:  "During the medication switch mood was \"not stable\". Having been on fluoxetine it has been \"more consistent\". He likes that he is not irritated. Not been drinking much which has been helpful in improving the mood. First few weeks of starting the school was difficult.  -Anxiety attacks: Have not had one for a long time. During the switch he was fixated on different symptoms.  -Last Lorazepam use has been beginning of the September.  -Has not been able to get out and socialize much. No new relationships.  -Two to three classes a day morning to 2 PM. Wakes up between 8 to 10 AM. He has been busy with extra-curricular activities. Busy with finding internships.    Situation to practice: No practice today.     Goal: Exposure Response Prevention therapy to address somatic symptoms (fixation on heart, weird sensations in the chest, aneurysm)    Treatment:  Cognitive Behavioral therapy     Assessment and Progress:   Barrett is a 23 year-old with history of depression and anxiety who is referred by psychiatrist for supportive psychotherapy with the working diagnosis of depressive disorder in remission. In our sessions he was found to have ruminative thoughts about fatal illnesses such as heart attack, aneurysm which leads to avoidance behavior and trigger anxiety/panic attacks. Barrett started a 12 week course of CBT in the format of ERP however due to summer breaks and moving back to [place] he was not able to follow up with the clinic.    Today he is interested in resuming the CBT sessions for ERP. We plan to meet on a weekly basis to continue working on the treatment goals.     Plan:   Weekly CBT sessions with ERP. Client is going to work on exposures and documenting the level of difficulty of the exposures each time. Next therapy appointment has been scheduled in a week to continue work on treatment goals.    Treatment Plan          SYMPTOMS; PROBLEMS   MEASURABLE GOALS;    FUNCTIONAL IMPROVEMENT INTERVENTIONS;   GAINS MADE " DISCHARGE CRITERIA   Ruminative thoughts related to fatal illnesses   Decoupling the physiologic bodily sensations and the ruminative thoughts of a fatal illness psycho-education   psychotherapy  relaxation techniques   stress management marked symptom improvement and reduced visit frequency     Date of most recent treatment plan: 10/20/2023  Date next treatment plan due: 3 months    Patient reported changes to current medication list and has been directed to notify the appropriate medical provider    Psychotherapy services during this visit included myself and the patient. Patient agreed with treatment plan. Discussed case with supervisor who also agreed with the treatment plan. Unable to sign in person due to visit being conducted through telehealth.     Aguilar Mccracken MD  Psychiatry Resident    Case discussed with attending therapist who was not present in person.

## 2023-10-20 NOTE — PATIENT INSTRUCTIONS
**For crisis resources, please see the information at the end of this document**   Patient Education    Thank you for coming to the [facility] [place] [facility].     Lab Testing:  If you had lab testing today and your results are reassuring or normal they will be mailed to you or sent through [place] within 7 days. If the lab tests need quick action we will call you with the results. The phone number we will call with results is # [phone]. If this is not the best number please call our clinic and change the number.     Medication Refills:  If you need any refills please call your pharmacy and they will contact us. Our fax number for refills is [phone].   Three business days of notice are needed for general medication refill requests.   Five business days of notice are needed for controlled substance refill requests.   If you need to change to a different pharmacy, please contact the new pharmacy directly. The new pharmacy will help you get your medications transferred.     Contact Us:  Please call [phone] during business hours (8-5:00 M-F).   If you have medication related questions after clinic hours, or on the weekend, please call [phone].     Financial Assistance [phone]   Medical Records [phone]       MENTAL HEALTH CRISIS RESOURCES:  For a emergency help, please call 911 or go to the nearest Emergency Department.     Emergency Walk-In Options:   EmPATH Unit @ [facility] ([place]): [phone] - Specialized mental health emergency area designed to be calming  [facility] ([place]): [phone]  [facility] ([place]): [phone]  [facility]): [phone]    [facility] Crisis Information:   [place]: [phone]  [place]: [phone]  [place] ([place]) - Adult: [phone]     Child: [phone]  [place] - Adult: [phone]     Child: [phone]  [place]:  [phone]  List of all [facility] resources:   https://[place].gov/dhs/people-we-serve/adults/health-care/mental-health/resources/crisis-contacts.jsp    National Crisis Information:   Crisis Text Line: Text  [place]  to 676565  Suicide & Crisis Lifeline: 988  National Suicide Prevention Lifeline: 7-070-863-TALK (1-437.655.5289)       For online chat options, visit https://suicidepreventionlifeline.org/chat/  Poison Control Center: 9-775-022-9161  Trans Lifeline: 7-455-430-0191 - Hotline for transgender people of all ages  The Minh Project: 1-340-275-9083 - Hotline for LGBT youth     For Non-Emergency Support:   Fast Tracker: Mental Health & Substance Use Disorder Resources -   https://www.EkockTargeted Technologiesn.org/

## 2023-11-03 ENCOUNTER — VIRTUAL VISIT (OUTPATIENT)
Dept: PSYCHIATRY | Facility: CLINIC | Age: 23
End: 2023-11-03
Payer: COMMERCIAL

## 2023-11-03 DIAGNOSIS — F40.01 PANIC DISORDER WITH AGORAPHOBIA: Primary | ICD-10-CM

## 2023-11-03 PROCEDURE — 90837 PSYTX W PT 60 MINUTES: CPT | Mod: VID

## 2023-11-03 NOTE — PROGRESS NOTES
Video- Visit Details  Type of service:  video visit for mental health treatment.  Time of service:  Date:  11/03/2023  Video Start Time:  11:07 AM        Video End Time: 12:01 PM    Reason for video visit: Client preference  Originating Site (patient location): [facility]  Distant Site (provider location):  Provider's home office  Mode of Communication:  Video Conference via AmWell      Consent: This virtual video visit will be conducted via a video call between the client and his therapist. This service lets us provide the care the patient needs with a video conversation. If during the course of the video call the provider feels a video visit is not appropriate, the patient will not be charged for this service. The patient has verbally consented to: the potential risks and benefits of telemedicine (video) versus in person care; bill my insurance or make self-payment for services provided; and responsibility for payment of non-covered services.     How would the patient like to obtain the AVS?  Martha    As the provider I attest to compliance with applicable laws and regulations related to telemedicine.    OUTPATIENT PSYCHOTHERAPY PROGRESS NOTE    Client Name: Barrett Bazan   YOB: 2000 (22 year old)   Service Type(s): 56249 psychotherapy (53+ min. with patient)    Diagnoses:   Panic disorder with agorophobia    Individuals Present:   Client attended alone    Treatment goal(s) being addressed:  1) Decoupling the physiologic bodily sensations and the ruminative thoughts of a fatal illness      Subjective:  - We talked about internal and external triggers of panic attacks and ways to proceed with exposure therapy.  - Discussed physical exercises to mimic internal triggers or looking at cues (videos, readings) that could trigger panic attacks and monitoring SUDS score.  - He chose to watch videos related to how heart works and we watched a video of heart aneurysm and in the meantime I measured SUDS  scores.      Exposure Tracking Log   Date and Time Exercise Length SUDS Rating (0-10)   23 27 minutes Beginnin  Peak: 3  End:0                Goal: Exposure Response Prevention therapy to address somatic symptoms (fixation on heart, weird sensations in the chest, aneurysm)    Treatment:  Cognitive Behavioral therapy     Assessment and Progress:   Barrett is a 23 year-old with history of depression and anxiety who is referred by psychiatrist for supportive psychotherapy with the working diagnosis of depressive disorder in remission. In our sessions he was found to have ruminative thoughts about fatal illnesses such as heart attack, aneurysm which leads to avoidance behavior and trigger anxiety/panic attacks. Barrett started a 12 week course of CBT in the format of ERP however due to summer breaks and moving back to [place] he was not able to follow up with the clinic.    Barrett realized that when he Is exposed to cues like hearing heart attack being discussed it is not distressing at the moment and SUDS score doesn't raise significantly.    We planed for the next visit to share screen of an impaired heart that stays during the session for a longer time and measure SUDS again.    Plan:   Weekly CBT sessions with ERP. Client is going to work on exposures and documenting the level of difficulty of the exposures each time. Next therapy appointment has been scheduled in a week to continue work on treatment goals.    Treatment Plan          SYMPTOMS; PROBLEMS   MEASURABLE GOALS;    FUNCTIONAL IMPROVEMENT INTERVENTIONS;   GAINS MADE DISCHARGE CRITERIA   Ruminative thoughts related to fatal illnesses   Decoupling the physiologic bodily sensations and the ruminative thoughts of a fatal illness psycho-education   psychotherapy  relaxation techniques   stress management marked symptom improvement and reduced visit frequency     Date of most recent treatment plan: 10/20/2023  Date next treatment plan due: 3 months    Patient  reported changes to current medication list and has been directed to notify the appropriate medical provider    Psychotherapy services during this visit included myself and the patient. Patient agreed with treatment plan. Discussed case with supervisor who also agreed with the treatment plan. Unable to sign in person due to visit being conducted through telehealth.     Aguilar Mccracken MD  Psychiatry Resident    Case discussed with attending therapist who was not present in person.

## 2023-11-14 ENCOUNTER — MYC MEDICAL ADVICE (OUTPATIENT)
Dept: PSYCHIATRY | Facility: CLINIC | Age: 23
End: 2023-11-14
Payer: COMMERCIAL

## 2023-11-14 DIAGNOSIS — F32.5 DEPRESSION, MAJOR, IN REMISSION (H): ICD-10-CM

## 2023-11-14 DIAGNOSIS — F41.8 ANXIETY WITH SOMATIC FEATURES: Primary | ICD-10-CM

## 2023-11-14 RX ORDER — TRAZODONE HYDROCHLORIDE 50 MG/1
50 TABLET, FILM COATED ORAL
COMMUNITY
End: 2023-11-14

## 2023-11-14 NOTE — TELEPHONE ENCOUNTER
Last seen: 10/11/23  RTC: 6 weeks  Cancel: none  No-show: none  Next appt: 12/6/23     Incoming refill from Patient via mychart    From chart note:   trazodone 50mg at bed PRN     Prescription for trazodone 50 mg at HS prn pended and routed to Lubna Whitfield for approval.  Medication sent to provider for review, as this hasn't been filled in the past by clinic (per chart review).

## 2023-11-15 RX ORDER — TRAZODONE HYDROCHLORIDE 50 MG/1
50 TABLET, FILM COATED ORAL
Qty: 30 TABLET | Refills: 0 | Status: SHIPPED | OUTPATIENT
Start: 2023-11-15 | End: 2023-12-06

## 2023-11-17 ENCOUNTER — VIRTUAL VISIT (OUTPATIENT)
Dept: PSYCHIATRY | Facility: CLINIC | Age: 23
End: 2023-11-17
Attending: PSYCHIATRY & NEUROLOGY
Payer: COMMERCIAL

## 2023-11-17 DIAGNOSIS — F40.01 PANIC DISORDER WITH AGORAPHOBIA: Primary | ICD-10-CM

## 2023-11-17 PROCEDURE — 90834 PSYTX W PT 45 MINUTES: CPT | Mod: VID

## 2023-11-19 NOTE — PATIENT INSTRUCTIONS
**For crisis resources, please see the information at the end of this document**   Patient Education    Thank you for coming to the [facility] [place] [facility].     Lab Testing:  If you had lab testing today and your results are reassuring or normal they will be mailed to you or sent through [employer] within 7 days. If the lab tests need quick action we will call you with the results. The phone number we will call with results is # [phone]. If this is not the best number please call our clinic and change the number.     Medication Refills:  If you need any refills please call your pharmacy and they will contact us. Our fax number for refills is [phone].   Three business days of notice are needed for general medication refill requests.   Five business days of notice are needed for controlled substance refill requests.   If you need to change to a different pharmacy, please contact the new pharmacy directly. The new pharmacy will help you get your medications transferred.     Contact Us:  Please call [phone] during business hours (8-5:00 M-F).   If you have medication related questions after clinic hours, or on the weekend, please call [phone].     Financial Assistance [phone]   Medical Records [phone]       MENTAL HEALTH CRISIS RESOURCES:  For a emergency help, please call 911 or go to the nearest Emergency Department.     Emergency Walk-In Options:   EmPATH Unit @ [facility] ([place]): [phone] - Specialized mental health emergency area designed to be calming  [facility] ([place]): [phone]  [facility] ([place]): [phone]  [facility]): [phone]    [facility] Crisis Information:   [place]: [phone]  [place]: [phone]  [place] ([place]) - Adult: [phone]     Child: [phone]  [place] - Adult: [phone]     Child: [phone]  [place]:  [phone]  List of all [facility] resources:   https://[place].gov/dhs/people-we-serve/adults/health-care/mental-health/resources/crisis-contacts.jsp    National Crisis Information:   Crisis Text Line: Text  [place]  to 486617  Suicide & Crisis Lifeline: 988  National Suicide Prevention Lifeline: 0-220-132-TALK (1-557.904.4185)       For online chat options, visit https://suicidepreventionlifeline.org/chat/  Poison Control Center: 2-500-265-3871  Trans Lifeline: 2-558-998-6849 - Hotline for transgender people of all ages  The [employer]: 6-244-576-2060 - Hotline for LGBT youth     For Non-Emergency Support:   Fast Tracker: Mental Health & Substance Use Disorder Resources -   https://www.[employer].org/

## 2023-12-04 ENCOUNTER — MYC MEDICAL ADVICE (OUTPATIENT)
Dept: PSYCHIATRY | Facility: CLINIC | Age: 23
End: 2023-12-04
Payer: COMMERCIAL

## 2023-12-06 ENCOUNTER — VIRTUAL VISIT (OUTPATIENT)
Dept: PSYCHIATRY | Facility: CLINIC | Age: 23
End: 2023-12-06
Attending: NURSE PRACTITIONER
Payer: COMMERCIAL

## 2023-12-06 DIAGNOSIS — F41.8 ANXIETY WITH SOMATIC FEATURES: ICD-10-CM

## 2023-12-06 DIAGNOSIS — F32.5 DEPRESSION, MAJOR, IN REMISSION (H): ICD-10-CM

## 2023-12-06 DIAGNOSIS — F41.1 GAD (GENERALIZED ANXIETY DISORDER): ICD-10-CM

## 2023-12-06 PROCEDURE — 99214 OFFICE O/P EST MOD 30 MIN: CPT | Mod: VID | Performed by: NURSE PRACTITIONER

## 2023-12-06 RX ORDER — TRAZODONE HYDROCHLORIDE 50 MG/1
50 TABLET, FILM COATED ORAL
Qty: 30 TABLET | Refills: 0 | Status: SHIPPED | OUTPATIENT
Start: 2023-12-06 | End: 2024-10-02

## 2023-12-06 ASSESSMENT — ANXIETY QUESTIONNAIRES
IF YOU CHECKED OFF ANY PROBLEMS ON THIS QUESTIONNAIRE, HOW DIFFICULT HAVE THESE PROBLEMS MADE IT FOR YOU TO DO YOUR WORK, TAKE CARE OF THINGS AT HOME, OR GET ALONG WITH OTHER PEOPLE: VERY DIFFICULT
1. FEELING NERVOUS, ANXIOUS, OR ON EDGE: MORE THAN HALF THE DAYS
4. TROUBLE RELAXING: MORE THAN HALF THE DAYS
GAD7 TOTAL SCORE: 11
2. NOT BEING ABLE TO STOP OR CONTROL WORRYING: SEVERAL DAYS
4. TROUBLE RELAXING: MORE THAN HALF THE DAYS
5. BEING SO RESTLESS THAT IT IS HARD TO SIT STILL: MORE THAN HALF THE DAYS
3. WORRYING TOO MUCH ABOUT DIFFERENT THINGS: SEVERAL DAYS
IF YOU CHECKED OFF ANY PROBLEMS ON THIS QUESTIONNAIRE, HOW DIFFICULT HAVE THESE PROBLEMS MADE IT FOR YOU TO DO YOUR WORK, TAKE CARE OF THINGS AT HOME, OR GET ALONG WITH OTHER PEOPLE: VERY DIFFICULT
6. BECOMING EASILY ANNOYED OR IRRITABLE: SEVERAL DAYS
3. WORRYING TOO MUCH ABOUT DIFFERENT THINGS: SEVERAL DAYS
GAD7 TOTAL SCORE: 11
7. FEELING AFRAID AS IF SOMETHING AWFUL MIGHT HAPPEN: MORE THAN HALF THE DAYS
1. FEELING NERVOUS, ANXIOUS, OR ON EDGE: MORE THAN HALF THE DAYS
2. NOT BEING ABLE TO STOP OR CONTROL WORRYING: SEVERAL DAYS
5. BEING SO RESTLESS THAT IT IS HARD TO SIT STILL: MORE THAN HALF THE DAYS
6. BECOMING EASILY ANNOYED OR IRRITABLE: SEVERAL DAYS
7. FEELING AFRAID AS IF SOMETHING AWFUL MIGHT HAPPEN: MORE THAN HALF THE DAYS
GAD7 TOTAL SCORE: 11
GAD7 TOTAL SCORE: 11

## 2023-12-06 NOTE — NURSING NOTE
Is the patient currently in the state of [place]? YES    Visit mode:VIDEO    If the visit is dropped, the patient can be reconnected by: VIDEO VISIT: Text to cell phone:   Telephone Information:   Mobile [phone]       Will anyone else be joining the visit? NO  (If patient encounters technical issues they should call [phone]    How would you like to obtain your AVS? MyChart    Are changes needed to the allergy or medication list? No    Reason for visit: No chief complaint on file.    Twila INGRAMF

## 2023-12-06 NOTE — PROGRESS NOTES
"Virtual Visit Details    Type of service:  Video Visit     Originating Location (pt. Location): Home  Distant Location (provider location):  On-site  Platform used for Video Visit: [facility]  Psychiatry Clinic  PSYCHIATRIC PROGRESS NOTE       Barrett Bazan is a 23 year old male who prefers the name Barrett and pronouns he, him.  Therapist: established with Dr. Nawaf Abbott  PCP: Eileen Andrew  Other Providers: followed by Dr Andrew in [place] as PCP    PREVIOUS PSYCH MED TRIALS:  - Lexapro 20mg (1st trial, \"it fixed all my problems with depression completely then the anxiety started)  - Vyvanse 20mg (activated anxiety)  - olanzapine zydis 5mg (given in 10/2021 ER note for marijuana abuse)  - trazodone 50mg  - Zoloft (ineffective, titrated down \"too fast, really bad panic attack\")  - Adderall, hydroxyzine trials referred to in chart    Pertinent Background:  See previous notes.  Psych critical item history includes one week admit in [place] in 2019, history of active SI with plan to shoot himself in 2019,     Interim History         The patient is a fair historian and reports good treatment adherence.    Last seen on 10/11/2023 when he chose to continue fluoxetine 10mg daily, lorazepam 0.5mg daily PRN, trazodone 50mg at bed PRN.    Assessed in ER in Oct 2021 for marijuana abuse, in Feb 2022 for palpitations (unremarkable EKG, 450 QTc), in June 2022 for anxiety.    He presents alone, his Mom Maddy was not present.    Since the last visit, he's been OK.   - doing \"fine\" with fluoxetine but anxiety is \"more prevalent, in the form of body pain, worry over your heart\"  - no brain zaps  - taking trazodone 2-3x weekly  - fearful of taking lorazepam though he's been taking it more \"frequently;\" he takes a whole pill, he has #15 at home  - have discussed importance of seeing one speciality provider for refills  - stressed with college, responsibilities and increased stress learning how to " "manage stress and stressful situations  - he left class once in the last semester due to stress  - appreciates having more control over his time, his schedule  - active in therapy  - he is no longer studying abroad in spring  - 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 and 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  - enjoys friends, going out to the bar, exercise  - support from his Mom and therapist, he often prefers to keep things private from friends  - coping skills include sour candy, seeking support, face the anxiety and neutralize it, paced breathing, self affirm, self talk, practicing gratitude, drinking water, eating, journaling    Recent Symptoms:   Depression:  PHQ 4, few days of trouble sleeping, trouble concentrating, speaking quickly  Anxiety: GAVIN 11, feeling tense, edgy, nervous, sense of dread; anxiety presents with body pain, racing thoughts, trying to use therapy skills to redirect, worries about his heart, brain health, his throat, mouth  - he gets pain and numbness in his arms, shoulders, worries he's having a heart attack  - transitions/ stress increase anxiety, finals, feeling trapped in a long car ride, visiting extended family or somewhere new is hard  - Mom Is treated for OCD, she observes obsessive thought pattern in Barrett; per chart, history of \"obsessive negative thoughts\"    ADVERSE EFFECTS: none  MEDICAL CONCERNS: none today    APPETITE: OK, GI upset and appetite decrease when he's anxious, 179# in July 2023  SLEEP: with trazodone, trying for sound sleep hygiene practices, sleeping 8+ hours      Recent Substance Use:  Alcohol- limits drinking, not going out much to drink with friends  - prefers beer and mixed drinks, might have 2 beers and 3 mixed drinks    Caffeine- one cup coffee daily, dislikes soda and energy drinks   Cannabis- none        Social/ Family History            FINANCIAL SUPPORT-  college student at [employer]        CHILDREN- None       LIVING SITUATION- lives in his own room in the [facility]   LEGAL- " None  EARLY HISTORY/ EDUCATION- born and raised in [place], born to  parents. Sister Dalia (b. ). Current [employer] student. He spends summers at home in [place].  SOCIAL/ SPIRITUAL SUPPORT- social support from Mom, therapist; identifies as not [employer]       CULTURAL INFLUENCES/ IMPACT- none       TRAUMA HISTORY- Mom perceives snow boarding accident in  as medical trauma  FEELS SAFE AT HOME- Yes   FAMILY PSYCH HISTORY: diffuse depression on maternal side; Mom- treated for OCD with fluvoxamine effectively, MGM- depression, MGF-  by suicide (shooting)    Medical / Surgical History                                 Patient Active Problem List   Diagnosis    Depression, major, in remission (H24)    Panic disorder without agoraphobia       No past surgical history on file.     Medical Review of Systems              A comprehensive review of systems was performed and is negative other than noted in the HPI.    Snowboarding accident in  (torn labrum); denies TBI, LOC. Denies seizures.     Allergy    Amoxicillin-pot clavulanate, Oxycodone, and Doxycycline  Current Medications        Current Outpatient Medications   Medication Sig Dispense Refill    FLUoxetine (PROZAC) 10 MG capsule Take 10mg cap daily 90 capsule 0    LORazepam (ATIVAN) 0.5 MG tablet Take one half to one (0.5 - 1) tab daily as needed for anxiety 5 tablet 0    traZODone (DESYREL) 50 MG tablet Take 1 tablet (50 mg) by mouth nightly as needed for sleep 30 tablet 0     Vitals         [3, 3]   There were no vitals taken for this visit.   Mental Status Exam          Alertness: alert  and oriented  Appearance: adequately groomed  Behavior/Demeanor: cooperative, pleasant and calm, with good  eye contact   Speech: normal and regular rate and rhythm  Language: no problems  Psychomotor: normal or unremarkable  Mood: anxious  Affect: restricted and appropriate; was congruent to mood; was congruent to content  Thought Process/Associations: unremarkable  Thought  Content:  Reports none;  Denies suicidal and violent ideation, delusions, preoccupations, obsessions , phobia , magical thinking, over-valued ideas and paranoid ideation  Perception:  Reports none;  Denies auditory hallucinations, visual hallucinations, visual distortion seen as shadows , depersonalization and derealization  Insight: fair  Judgment: adequate for safety  Cognition: appears grossly intact; formal cognitive testing was not done  Gait/Station and/or Muscle Strength/Tone:  N/A    Labs and Data                          Rating Scales:      Answers submitted by the patient for this visit:  Patient Health Questionnaire (Submitted on 12/6/2023)  If you checked off any problems, how difficult have these problems made it for you to do your work, take care of things at home, or get along with other people?: Somewhat difficult  PHQ9 TOTAL SCORE: 4  GAVIN-7 (Submitted on 12/6/2023)  GAVIN 7 TOTAL SCORE: 11    PHQ9 Today:        9/10/2023     3:13 PM 10/11/2023     9:56 AM 12/6/2023     4:27 PM   PHQ   PHQ-9 Total Score 2 3 4   Q9: Thoughts of better off dead/self-harm past 2 weeks Not at all Not at all Not at all     Diagnosis      Impression includes recurrent, moderate MDD in partial remission and GAVIN responding to meds and therapy  - per patient, previous diagnosis of ADHD is managed with behavioral skills as stimulant was activating  - per Mom, medical trauma from snowboarding accident in 2022     Assessment         TODAY, the following items were reviewed:     ([place] & [place]): 10/2023    PSYCHOTROPIC DRUG INTERACTIONS: none with Effexor, Ativan    Drug Interaction Management: Monitoring for adverse effects, routine vitals, using lowest therapeutic dose of [psychotropics] and patient is aware of risks    Plan                                                                                                                     1) discussed options, risks, benefits including paradoxical risk, his willingness to be  forthcoming with writer and therapist, utility of Prozac increase and Propranolol PRN for physical symptoms of anxiety, today, he chooses to trial increasing fluoxetine to 20mg daily, lorazepam 0.5mg daily PRN (has #15), trazodone 50mg at bed PRN (needs)  - he'll message in 4 weeks with an update, may trial Propranolol 10-20mg daily PRN pending symptom response  - reviewed writer's conservative prescribing style, agreed to fill up to #60 lorazepam per year as needed or #5/ month, no RF unless needed)    2) active in ERP therapy    RTC: 8 weeks, sooner as needed    CRISIS NUMBERS:   Provided routinely in AVS.    Treatment Risk Statement:  The patient understands the risks, benefits, adverse effects and alternatives. Agrees to treatment with the capacity to do so. No medical contraindications to treatment. Agrees to call clinic for any problems. The patient understands to call 911 or go to the nearest ED if life threatening or urgent symptoms occur.     WHODAS 2.0  TODAY total score = N/A; [a 12-item WHODAS 2.0 assessment was not completed by the pt today and/or recorded in EPIC].    PROVIDER:  JENNIFER Alvarez CNP

## 2023-12-08 ENCOUNTER — VIRTUAL VISIT (OUTPATIENT)
Dept: PSYCHIATRY | Facility: CLINIC | Age: 23
End: 2023-12-08
Attending: PSYCHOLOGIST
Payer: COMMERCIAL

## 2023-12-08 DIAGNOSIS — F45.21 ILLNESS ANXIETY DISORDER: ICD-10-CM

## 2023-12-08 DIAGNOSIS — F40.01 PANIC DISORDER WITH AGORAPHOBIA: Primary | ICD-10-CM

## 2023-12-08 PROCEDURE — 90837 PSYTX W PT 60 MINUTES: CPT | Mod: VID

## 2023-12-08 NOTE — PROGRESS NOTES
[facility]  OUTPATIENT PSYCHOTHERAPY PROGRESS NOTE       Video- Visit Details  Type of service:  video visit for mental health treatment.  Time of service:  Date:  12/8/2023  Video Start Time:  11:16 AM        Video End Time: 12:10 PM    Reason for video visit: Client preference  Originating Site (patient location): [facility]  Distant Site (provider location):  Provider's home office  Mode of Communication:  Video Conference via AmWell      Consent: This virtual video visit will be conducted via a video call between the client and his therapist. This service lets us provide the care the patient needs with a video conversation. If during the course of the video call the provider feels a video visit is not appropriate, the patient will not be charged for this service. The patient has verbally consented to: the potential risks and benefits of telemedicine (video) versus in person care; bill my insurance or make self-payment for services provided; and responsibility for payment of non-covered services.     How would the patient like to obtain the AVS?  [place]    As the provider I attest to compliance with applicable laws and regulations related to telemedicine.      Client Name: Barrett Bazan   YOB: 2000 (23 year old)     Provider: Aguilar Mccracken MD  Service Type(s): 97416 psychotherapy (54 min. with patient)  Procedure: Individual psychotherapy session  Treatment modality: Cognitive Behavioral Therapy  Individuals Present: Client attended alone    Diagnoses:   Panic disorder with agoraphobia  Illness anxiety disorder      Treatment goal(s) being addressed:  1) Defusing the physiologic bodily sensations and the ruminative thoughts of a fatal illness  2) Exposure Response Prevention therapy to address somatic symptoms (fixation on heart, weird sensations in the chest, aneurysm)    Session content:   We discussed updates  "in Barrett's life. Rates depression low and manageable. No SI/HI. No safety concerns.  Over the past three weeks he was significantly more anxious. He had to fly twice drive for longer hours. He needed to use his lorazepam twice at school and once at home. He endorsed \"anxiety pain\" and numbness down his left arm a couple of times. He has started taking sour candies and pop rocks to break the anxiety attacks.    We identified the following triggers for the anxiety attacks over the past few weeks:    -Thinking about mom's physical health  -Having an unorganized schedule  =========================  -During a work out session  -Being sore from work out  -Sensing his heart beat  -The post drinking day  =========================  -Flying  -Driving at night      Self-report assessments:   Answers submitted by the patient for this visit:  GAVIN-7 (Submitted on 12/6/2023)  GAVIN 7 TOTAL SCORE: 11      Behavioral assignment:   Today, we discussed the concept of defusion and reviewed examples of defusing bodily sensations and the thoughts with focus on passing sensations and thoughts. Writer provided emotional support, validation and active listening. Barrett was encouraged to practice defusion as frequently as possible.      Behavioral observations/mental status:   Patient arrived 6 minutes late for the session. Patient was appropriately groomed and dressed. Eye contact was good. Mood today was \"fine\". Observed affect was euthymic and bright, reactive to the content. Speech was normal in tone, rate and volume. Thought process was linear, logical, goal oriented. Patient was actively engaged in session.        MENTAL STATUS EXAM  Appearance: appropriately groomed  Attitude: cooperative and calm  Behavior: engaged  Eye Contact: normal  Speech: normal  Orientation: grossly oriented  Mood:  \"fine\"  Affect: Mood Congruent, bright, full, appropriate  Thought Process: clear  Suicidal Ideation: reports thoughts, no " intention  Hallucination: no        Plan:   -Continue weekly individual CBT psychotherapy sessions for the above diagnoses.  -Practice defusing technics discussed today on his own at least 15 minutes a day.  Next appointment: 12/15/2023         Provider: Aguilar Mccracken MD     The supervisor, Dr. Khalif Pelaez, was not present during the session. The case will be discussed. The note will be reviewed and signed by supervisor, Dr. Khalif Pelaez.

## 2023-12-15 ENCOUNTER — VIRTUAL VISIT (OUTPATIENT)
Dept: PSYCHIATRY | Facility: CLINIC | Age: 23
End: 2023-12-15
Attending: PSYCHIATRY & NEUROLOGY
Payer: COMMERCIAL

## 2023-12-15 DIAGNOSIS — F40.01 PANIC DISORDER WITH AGORAPHOBIA: Primary | ICD-10-CM

## 2023-12-15 PROCEDURE — 90837 PSYTX W PT 60 MINUTES: CPT | Mod: VID

## 2023-12-15 NOTE — PROGRESS NOTES
[facility]  OUTPATIENT PSYCHOTHERAPY PROGRESS NOTE       Video- Visit Details  Type of service:  video visit for mental health treatment.  Time of service:  Date:  12/15/2023  Video Start Time:  11:22 AM        Video End Time: 12:15 PM    Reason for video visit: Client preference  Originating Site (patient location): [facility]  Distant Site (provider location):  Provider's home office  Mode of Communication:  Video Conference via AmWell      Consent: This virtual video visit will be conducted via a video call between the client and his therapist. This service lets us provide the care the patient needs with a video conversation. If during the course of the video call the provider feels a video visit is not appropriate, the patient will not be charged for this service. The patient has verbally consented to: the potential risks and benefits of telemedicine (video) versus in person care; bill my insurance or make self-payment for services provided; and responsibility for payment of non-covered services.     How would the patient like to obtain the AVS?  Chaset    As the provider I attest to compliance with applicable laws and regulations related to telemedicine.      Client Name: Barrett Bazan   YOB: 2000 (23 year old)     Provider: Aguilar Mccracken MD  Service Type(s): 37421 psychotherapy (53 min. with patient)  Procedure: Individual psychotherapy session  Treatment modality: Cognitive Behavioral Therapy  Individuals Present: Client attended alone    Diagnoses:   Panic disorder with agoraphobia      Treatment goal(s) being addressed:  1) Defusing the physiologic bodily sensations and the ruminative thoughts of a fatal illness  2) Exposure Response Prevention therapy to address somatic symptoms (fixation on heart, weird sensations in the chest, aneurysm)    Session content:   Today we discussed the updates in Barrett's  "life. He has been having less baseline anxiety, an no anxiety attacks other than appropriate situational anxiety. His mood is better than last week. No SI/HI. No safety concerns. Writer provided emotional support, validation and active listening.     In today's session we focused on further identifying the triggers that can provoke anxiety attacks with a focus on bodily sensations. Writer helped Barrett in recognizing the immediate thoughts that come after a bodily sensation arises.     The immediate thoughts boils down to \"something is different with my BLANK\". The most repeated bodily sensations are breathing patterns, heart beat, numbness and tingling and pain in arm and shoulder. The immediate thought then leads to a few main thoughts:    \"My body is going to fail\"  \"I can not get off the highway\"  \"I might crash\"  \"I won't be able to control this vehicle\"  \"it gets worse\"    The cognitive distancing technic discussed in depth today was the importance of acknowledging the thought as a thought rather than a fact by adding the phrase \"I am having the thought that...\" to the beginning. Questions and concerns were addressed regarding the technic.      Self-report assessments:   None      Behavioral assignment:   Today, we further discussed the concept of defusion and reviewed examples of defusing bodily sensations and the thoughts with focus on externalizing thoughts and creating a distance between self and thoughts. Barrett was encouraged to practice defusion as frequently as possible.      Behavioral observations/mental status:   Patient arrived 12 minutes late for the session. Patient was appropriately groomed and dressed. Eye contact was good. Mood today was \"better\". Observed affect was euthymic and bright, reactive to the content. Speech was normal in tone, rate and volume. Thought process was linear, logical, goal oriented. Patient was actively engaged in session.        MENTAL STATUS EXAM  Appearance: " "appropriately groomed  Attitude: cooperative and calm  Behavior: engaged  Eye Contact: normal  Speech: normal  Orientation: grossly oriented  Mood:  \"better\"  Affect: Mood Congruent, bright, full, appropriate  Thought Process: clear  Suicidal Ideation: reports thoughts, no intention  Hallucination: no        Plan:   -Continue weekly individual CBT psychotherapy sessions for the above diagnoses. Patient will be away for winter break. Will be back for the third week of January 2024.  -Practice defusing technics discussed today on his own at least 15 minutes a day.  Next appointment: 1/19/2023         Provider: Aguilar Mccracken MD     The supervisor, Dr. Khalif Pelaez, was not present during the session. The case will be discussed. The note will be reviewed and signed by supervisor, Dr. Khalif Pelaez.    "

## 2024-02-05 ENCOUNTER — VIRTUAL VISIT (OUTPATIENT)
Dept: PSYCHIATRY | Facility: CLINIC | Age: 24
End: 2024-02-05
Attending: NURSE PRACTITIONER
Payer: COMMERCIAL

## 2024-02-05 DIAGNOSIS — F41.1 GAD (GENERALIZED ANXIETY DISORDER): ICD-10-CM

## 2024-02-05 DIAGNOSIS — F32.5 DEPRESSION, MAJOR, IN REMISSION (H): ICD-10-CM

## 2024-02-05 PROCEDURE — 99214 OFFICE O/P EST MOD 30 MIN: CPT | Mod: 95 | Performed by: NURSE PRACTITIONER

## 2024-02-05 ASSESSMENT — PATIENT HEALTH QUESTIONNAIRE - PHQ9
SUM OF ALL RESPONSES TO PHQ QUESTIONS 1-9: 0
SUM OF ALL RESPONSES TO PHQ QUESTIONS 1-9: 0
10. IF YOU CHECKED OFF ANY PROBLEMS, HOW DIFFICULT HAVE THESE PROBLEMS MADE IT FOR YOU TO DO YOUR WORK, TAKE CARE OF THINGS AT HOME, OR GET ALONG WITH OTHER PEOPLE: NOT DIFFICULT AT ALL

## 2024-02-05 NOTE — PROGRESS NOTES
"Virtual Visit Details    Type of service:  Video Visit     Originating Location (pt. Location): Home  Distant Location (provider location):  On-site  Platform used for Video Visit: [facility]  Psychiatry Clinic  PSYCHIATRIC PROGRESS NOTE       Barrett Bazan is a 23 year old male who prefers the name Barrett and pronouns he, him.  Therapist: established with Dr. Nawaf Abbott  PCP: Eileen Andrew  Other Providers: followed by Dr Andrew in [place] as PCP    PREVIOUS PSYCH MED TRIALS:  - Lexapro 20mg (1st trial, \"it fixed all my problems with depression completely then the anxiety started)  - Vyvanse 20mg (activated anxiety)  - olanzapine zydis 5mg (given in 10/2021 ER note for marijuana abuse)  - trazodone 50mg  - Zoloft (ineffective, titrated down \"too fast, really bad panic attack\")  - Adderall, hydroxyzine trials referred to in chart    Pertinent Background:  See previous notes.  Psych critical item history includes one week admit in [place] in 2019, history of active SI with plan to shoot himself in 2019,     Interim History         The patient is a fair historian and reports good treatment adherence.    Last seen on 12/06/2023 when he chose to trial increasing fluoxetine to 20mg daily, lorazepam 0.5mg daily PRN, continue trazodone 50mg at bed PRN     He presents alone, his Mom Maddy was not present.    Since the last visit, he's been OK, he noticed a \"pretty immediate change when we stepped up the medication\".   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, he'll be in college another year  - building organizational skills, noticing he's less stressed, grades are improving  - enjoys friends, going out to the bar, " "exercise  - support from his Mom and therapist, he often prefers to keep things private from friends  - coping skills include sour candy, seeking support, face the anxiety and neutralize it, paced breathing, self affirm, self talk, practicing gratitude, drinking water, eating, journaling    Recent Symptoms:   Depression: PHQ 0, he denies significant depression symptoms  Anxiety: less intense, less frequent anxiety symptoms; anxiety (adrenaline sense?) might increase after a hard workout, his worry for his heart health as \"dissipated to a degree\"  - history of difficulty with transitions/ stress increasing anxiety around time of finals, feeling trapped in a long car ride, visiting extended family or somewhere new is hard    ADVERSE EFFECTS: none  MEDICAL CONCERNS: none today    APPETITE: OK, intermittent GI upset, estimates weighing in low 170s, 179# in 2023  SLEEP: making time for sleep, averaging 8 hours      Recent Substance Use:  Alcohol- not going out as much, monitors how he responds to alcohol the next day, he prefers beer and mixed drinks, he might have 3 beers, 2 mixed drinks and 0.5 shot over 6 hours  Caffeine- one cup coffee daily, dislikes soda and energy drinks   Cannabis- none        Social/ Family History            FINANCIAL SUPPORT-  college student at [employer]        CHILDREN- None       LIVING SITUATION- lives in his own room in a frat house   LEGAL- None  EARLY HISTORY/ EDUCATION- born and raised in [place], born to  parents. Sister Dalia (b. ). Current [employer] student. He spends summers at home in [place].  SOCIAL/ SPIRITUAL SUPPORT- social support from Mom, therapist; identifies as not Scientologist       CULTURAL INFLUENCES/ IMPACT- none       TRAUMA HISTORY- Mom perceives snow boarding accident in  as medical trauma  FEELS SAFE AT HOME- Yes   FAMILY PSYCH HISTORY: diffuse depression on maternal side; Mom- treated for OCD with fluvoxamine effectively, MGM- depression, MGF-  by suicide " (shooting)    Medical / Surgical History                                 Patient Active Problem List   Diagnosis    Depression, major, in remission (H24)    Panic disorder without agoraphobia       No past surgical history on file.     Medical Review of Systems              A comprehensive review of systems was performed and is negative other than noted in the HPI.    Snowboarding accident in 2022 (torn labrum); denies TBI, LOC. Denies seizures.     Allergy    Amoxicillin-pot clavulanate, Oxycodone, and Doxycycline  Current Medications        Current Outpatient Medications   Medication Sig Dispense Refill    FLUoxetine (PROZAC) 20 MG capsule Take one 20mg cap daily 30 capsule 1    LORazepam (ATIVAN) 0.5 MG tablet Take one half to one (0.5 - 1) tab daily as needed for anxiety 5 tablet 0    traZODone (DESYREL) 50 MG tablet Take 1 tablet (50 mg) by mouth nightly as needed for sleep 30 tablet 0     Vitals         [3, 3]   There were no vitals taken for this visit.   Mental Status Exam          Alertness: alert  and oriented  Appearance: adequately groomed  Behavior/Demeanor: cooperative, pleasant and calm, with good  eye contact   Speech: normal and regular rate and rhythm  Language: no problems  Psychomotor: normal or unremarkable  Mood: improved, less anxious  Affect: restricted and appropriate; was congruent to mood; was congruent to content  Thought Process/Associations: unremarkable  Thought Content:  Reports none;  Denies suicidal and violent ideation, delusions, preoccupations, obsessions , phobia , magical thinking, over-valued ideas and paranoid ideation  Perception:  Reports none;  Denies auditory hallucinations, visual hallucinations, visual distortion seen as shadows , depersonalization and derealization  Insight: fair  Judgment: adequate for safety  Cognition: appears grossly intact; formal cognitive testing was not done  Gait/Station and/or Muscle Strength/Tone:  N/A    Labs and Data                           Rating Scales:      Answers submitted by the patient for this visit:  Patient Health Questionnaire (Submitted on 2/5/2024)  If you checked off any problems, how difficult have these problems made it for you to do your work, take care of things at home, or get along with other people?: Not difficult at all  PHQ9 TOTAL SCORE: 0    PHQ9 Today:        10/11/2023     9:56 AM 12/6/2023     4:27 PM 2/5/2024     3:29 PM   PHQ   PHQ-9 Total Score 3 4 0   Q9: Thoughts of better off dead/self-harm past 2 weeks Not at all Not at all Not at all     Diagnosis      Impression includes recurrent, moderate MDD in partial remission and GAVIN responding to meds and therapy  - per patient, previous diagnosis of ADHD is managed with behavioral skills as stimulant was activating  - per Mom, medical trauma from snowboarding accident in 2022     Assessment         TODAY, the following items were reviewed:     ([place] & [place]): 10/2023    PSYCHOTROPIC DRUG INTERACTIONS: none with Effexor, Ativan    Drug Interaction Management: Monitoring for adverse effects, routine vitals, using lowest therapeutic dose of [psychotropics] and patient is aware of risks    Plan                                                                                                                     1) he chooses to continue fluoxetine 20mg daily (needs), lorazepam 0.5mg daily PRN, trazodone 50mg at bed PRN (has both)  - reviewed writer's conservative prescribing style, agreed to fill up to #60 lorazepam per year as needed or #5/ month, no RF unless needed)    2) active in ERP therapy    RTC: 12 weeks, sooner as needed    CRISIS NUMBERS:   Provided routinely in AVS.    Treatment Risk Statement:  The patient understands the risks, benefits, adverse effects and alternatives. Agrees to treatment with the capacity to do so. No medical contraindications to treatment. Agrees to call clinic for any problems. The patient understands to call 911 or go to the nearest ED if life  threatening or urgent symptoms occur.     WHODAS 2.0  TODAY total score = N/A; [a 12-item WHODAS 2.0 assessment was not completed by the pt today and/or recorded in EPIC].    PROVIDER:  JENNIFER Alvarez CNP

## 2024-05-09 ENCOUNTER — VIRTUAL VISIT (OUTPATIENT)
Dept: PSYCHIATRY | Facility: CLINIC | Age: 24
End: 2024-05-09
Attending: NURSE PRACTITIONER
Payer: COMMERCIAL

## 2024-05-09 DIAGNOSIS — F41.1 GAD (GENERALIZED ANXIETY DISORDER): ICD-10-CM

## 2024-05-09 DIAGNOSIS — F32.5 DEPRESSION, MAJOR, IN REMISSION (H): Primary | ICD-10-CM

## 2024-05-09 PROCEDURE — 99214 OFFICE O/P EST MOD 30 MIN: CPT | Mod: 95 | Performed by: NURSE PRACTITIONER

## 2024-05-09 ASSESSMENT — PATIENT HEALTH QUESTIONNAIRE - PHQ9
SUM OF ALL RESPONSES TO PHQ QUESTIONS 1-9: 2
SUM OF ALL RESPONSES TO PHQ QUESTIONS 1-9: 2
10. IF YOU CHECKED OFF ANY PROBLEMS, HOW DIFFICULT HAVE THESE PROBLEMS MADE IT FOR YOU TO DO YOUR WORK, TAKE CARE OF THINGS AT HOME, OR GET ALONG WITH OTHER PEOPLE: NOT DIFFICULT AT ALL

## 2024-05-09 NOTE — PROGRESS NOTES
"Virtual Visit Details    Type of service:  Video Visit     Originating Location (pt. Location): Home  Distant Location (provider location):  Off-site  Platform used for Video Visit: [facility]  Psychiatry Clinic    PSYCHIATRIC PROGRESS NOTE       Barrett Bazan is a 23 year old male who prefers the name Barrett and pronouns he, him.  Therapist: last saw Dr. Nawaf Abbott in 12/2023  PCP: Eileen Andrew  Other Providers: followed by Dr Andrew in [place] as PCP    PREVIOUS PSYCH MED TRIALS:  - Lexapro 20mg (1st trial, \"it fixed all my problems with depression completely then the anxiety started)  - Vyvanse 20mg (activated anxiety)  - olanzapine zydis 5mg (given in 10/2021 ER note for marijuana abuse)  - trazodone 50mg  - Zoloft (ineffective, titrated down \"too fast, really bad panic attack\")  - Adderall, hydroxyzine trials referred to in chart    Pertinent Background:  See previous notes.  Psych critical item history includes one week admit in [place] in 2019, history of active SI with plan to shoot himself in 2019,     Interim History         The patient is a fair historian and reports good treatment adherence.    Last seen on 2/05/2024 when he chose to continue fluoxetine 20mg daily, lorazepam 0.5mg daily PRN, trazodone 50mg at bed PRN    He presents alone, his Mom Maddy was not present.    Since the last visit, he's been good.   - taking meds daily, he has a reminder on his phone plus strong daily routine  - he took trazodone during finals   - no need reported for lorazepam  - he is a part time rising senior majoring in economics   - he finished finals over a two week period, his grades are better  - he has a good balance with college responsibilities and social life   - support from his Mom and therapist, he often prefers to keep things private from friends  - coping skills include sour candy, seeking support, face the anxiety and neutralize it, paced breathing, self affirm, self " "talk, practicing gratitude, drinking water, eating, journaling    Recent Symptoms:   Depression: PHQ 2, few days of interrupted sleep and low energy   Anxiety: less intense, less frequent anxiety symptoms  - no racing heart at rest or with exertion, history of worrying for cardiac health   - while he dislikes change, he is aware he is \"very capable of coping and adapting to it\"     ADVERSE EFFECTS: none  MEDICAL CONCERNS: none today    APPETITE: OK, fewer episodes of GI upset after finishing finals, estimates weighing in low to mid 170s, 179# in 2023  SLEEP: sleeping 8 hours      Recent Substance Use:  Alcohol- going out less, he might have 3 beers, 2 mixed drinks and 0.5 shot over 6 hours; he's reducing intake and notices he has fewer hangovers  Caffeine- one cup coffee daily, dislikes soda and energy drinks   Cannabis- none        Social/ Family History            FINANCIAL SUPPORT-  college student at [employer]        CHILDREN- None       LIVING SITUATION- moving into a \"house complex\" in summer 2024 and an apartment this     LEGAL- None  EARLY HISTORY/ EDUCATION- born and raised in [place], born to  parents. Sister Dalia (b. ). Current [employer] student. He spends summers at home in [place].  SOCIAL/ SPIRITUAL SUPPORT- social support from Mom, therapist; identifies as not Jainism       CULTURAL INFLUENCES/ IMPACT- none       TRAUMA HISTORY- Mom perceives snow boarding accident in  as medical trauma  FEELS SAFE AT HOME- Yes   FAMILY PSYCH HISTORY: diffuse depression on maternal side; Mom- treated for OCD with fluvoxamine effectively, MGM- depression, MGF-  by suicide (shooting)    Medical / Surgical History                                 Patient Active Problem List   Diagnosis    Depression, major, in remission (H24)    Panic disorder without agoraphobia       No past surgical history on file.     Medical Review of Systems              A comprehensive review of systems was performed and is negative other " than noted in the HPI.    Snowboarding accident in 2022 (torn labrum); denies TBI, LOC. Denies seizures.     Allergy    Amoxicillin-pot clavulanate, Oxycodone, and Doxycycline  Current Medications        Current Outpatient Medications   Medication Sig Dispense Refill    FLUoxetine (PROZAC) 20 MG capsule Take one 20mg cap daily 90 capsule 1    LORazepam (ATIVAN) 0.5 MG tablet Take one half to one (0.5 - 1) tab daily as needed for anxiety 5 tablet 0    traZODone (DESYREL) 50 MG tablet Take 1 tablet (50 mg) by mouth nightly as needed for sleep 30 tablet 0     Vitals          There were no vitals taken for this visit.     Pulse Readings from Last 5 Encounters:   02/24/22 60   10/15/21 74     Wt Readings from Last 5 Encounters:   02/24/22 79.4 kg (175 lb)   10/15/21 80.3 kg (177 lb)     BP Readings from Last 5 Encounters:   02/24/22 133/59   10/15/21 98/50     Mental Status Exam          Alertness: alert  and oriented  Appearance: adequately groomed  Behavior/Demeanor: cooperative, pleasant and calm, with good  eye contact   Speech: normal and regular rate and rhythm  Language: no problems  Psychomotor: normal or unremarkable  Mood: improved, stable, less anxious  Affect: restricted and appropriate; was congruent to mood; was congruent to content  Thought Process/Associations: unremarkable  Thought Content:  Reports none;  Denies suicidal and violent ideation, delusions, preoccupations, obsessions , phobia , magical thinking, over-valued ideas and paranoid ideation  Perception:  Reports none;  Denies auditory hallucinations, visual hallucinations, visual distortion seen as shadows , depersonalization and derealization  Insight: fair  Judgment: adequate for safety  Cognition: appears grossly intact; formal cognitive testing was not done  Gait/Station and/or Muscle Strength/Tone:  N/A    Labs and Data                          Rating Scales:      Answers submitted by the patient for this visit:  Patient Health Questionnaire  (Submitted on 5/9/2024)  If you checked off any problems, how difficult have these problems made it for you to do your work, take care of things at home, or get along with other people?: Not difficult at all  PHQ9 TOTAL SCORE: 2    PHQ9 Today:        12/6/2023     4:27 PM 2/5/2024     3:29 PM 5/9/2024     3:21 PM   PHQ   PHQ-9 Total Score 4 0 2   Q9: Thoughts of better off dead/self-harm past 2 weeks Not at all Not at all Not at all     Diagnosis      Impression includes recurrent, moderate MDD in partial remission and GAVIN responding to meds and therapy  - per patient, previous diagnosis of ADHD is managed with behavioral skills as stimulant was activating  - per Mom, medical trauma from snowboarding accident in 2022     Assessment         TODAY, the following items were reviewed:     ([place] & [place]): 10/2023    PSYCHOTROPIC DRUG INTERACTIONS: none with Effexor, Ativan    Drug Interaction Management: Monitoring for adverse effects, routine vitals, using lowest therapeutic dose of [psychotropics] and patient is aware of risks    Plan                                                                                                                     1) he chooses to continue fluoxetine 20mg daily, lorazepam 0.5mg daily PRN, trazodone 50mg at bed PRN (has all)  - reviewed writer's conservative prescribing style, previously agreed to fill up to #60 lorazepam per year as needed or #5/ month, no RF unless needed)    2) stopped therapy, using skills    RTC: 12 weeks, sooner as needed    CRISIS NUMBERS:   Provided routinely in AVS.    Treatment Risk Statement:  The patient understands the risks, benefits, adverse effects and alternatives. Agrees to treatment with the capacity to do so. No medical contraindications to treatment. Agrees to call clinic for any problems. The patient understands to call 911 or go to the nearest ED if life threatening or urgent symptoms occur.     WHODAS 2.0  TODAY total score = N/A; [a 12-item  WHODAS 2.0 assessment was not completed by the pt today and/or recorded in EPIC].    PROVIDER:  JENNIFER Alvarez CNP

## 2024-05-09 NOTE — NURSING NOTE
Is the patient currently in the state of [place]? YES    Visit mode:VIDEO    If the visit is dropped, the patient can be reconnected by: VIDEO VISIT: Text to cell phone:   Telephone Information:   Mobile [phone]       Will anyone else be joining the visit? NO  (If patient encounters technical issues they should call [phone])    How would you like to obtain your AVS? MyChart    Are changes needed to the allergy or medication list? No    Are refills needed on medications prescribed by this physician? NO    Reason for visit: RECHECK    Alisa BEGUM

## 2024-05-21 ENCOUNTER — MYC MEDICAL ADVICE (OUTPATIENT)
Dept: PSYCHIATRY | Facility: CLINIC | Age: 24
End: 2024-05-21
Payer: COMMERCIAL

## 2024-05-21 DIAGNOSIS — F41.1 GAD (GENERALIZED ANXIETY DISORDER): ICD-10-CM

## 2024-05-21 DIAGNOSIS — F32.5 DEPRESSION, MAJOR, IN REMISSION (H): ICD-10-CM

## 2024-05-21 NOTE — TELEPHONE ENCOUNTER
Per chart review, patient has remaining 90 day supply refill available in [place]. Will pend refill for pharmacy in [place] per patient request.  Routed to Lubna Whitfield for approval.

## 2024-10-02 ENCOUNTER — VIRTUAL VISIT (OUTPATIENT)
Dept: PSYCHIATRY | Facility: CLINIC | Age: 24
End: 2024-10-02
Attending: NURSE PRACTITIONER
Payer: COMMERCIAL

## 2024-10-02 DIAGNOSIS — F32.5 DEPRESSION, MAJOR, IN REMISSION (H): ICD-10-CM

## 2024-10-02 DIAGNOSIS — F41.1 GAD (GENERALIZED ANXIETY DISORDER): ICD-10-CM

## 2024-10-02 DIAGNOSIS — F41.8 ANXIETY WITH SOMATIC FEATURES: ICD-10-CM

## 2024-10-02 PROCEDURE — 99214 OFFICE O/P EST MOD 30 MIN: CPT | Mod: 95 | Performed by: NURSE PRACTITIONER

## 2024-10-02 RX ORDER — TRAZODONE HYDROCHLORIDE 50 MG/1
50 TABLET, FILM COATED ORAL
Qty: 30 TABLET | Refills: 0 | Status: SHIPPED | OUTPATIENT
Start: 2024-10-02

## 2024-10-02 ASSESSMENT — PATIENT HEALTH QUESTIONNAIRE - PHQ9
10. IF YOU CHECKED OFF ANY PROBLEMS, HOW DIFFICULT HAVE THESE PROBLEMS MADE IT FOR YOU TO DO YOUR WORK, TAKE CARE OF THINGS AT HOME, OR GET ALONG WITH OTHER PEOPLE: NOT DIFFICULT AT ALL
SUM OF ALL RESPONSES TO PHQ QUESTIONS 1-9: 3
SUM OF ALL RESPONSES TO PHQ QUESTIONS 1-9: 3

## 2024-10-02 ASSESSMENT — PAIN SCALES - GENERAL: PAINLEVEL: NO PAIN (0)

## 2024-10-02 NOTE — NURSING NOTE
Current patient location:  [place]     Is the patient currently in the state [facility]? YES    Visit mode:VIDEO    If the visit is dropped, the patient can be reconnected by: VIDEO VISIT: Text to cell phone:   Telephone Information:   Mobile [phone]       Will anyone else be joining the visit? NO  (If patient encounters technical issues they should call [phone])    Are changes needed to the allergy or medication list? No    Are refills needed on medications prescribed by this physician? NO    Rooming Documentation:  Questionnaire(s) completed    Reason for visit: RECHECK    Samuel BEGUM

## 2024-10-02 NOTE — PROGRESS NOTES
"Virtual Visit Details    Type of service:  Video Visit (Chance perri, switched to phone 4:02p -4:20p)    Originating Location (pt. Location): Home  Distant Location (provider location):  Off-site  Platform used for Video Visit: Well       [facility]  Psychiatry Clinic    PSYCHIATRIC PROGRESS NOTE       Barrett Bazan is a 24 year old male who prefers the name Barrett and pronouns he, him.  Therapist: last saw Dr. Nawaf Abbott in 12/2023  PCP: Eileen Andrew  Other Providers: followed by Dr Andrew in [place] as PCP    PREVIOUS PSYCH MED TRIALS:  - Lexapro 20mg (1st trial, \"it fixed all my problems with depression completely then the anxiety started)  - Vyvanse 20mg (activated anxiety)  - olanzapine zydis 5mg (given in 10/2021 ER note for marijuana abuse)  - trazodone 50mg  - Zoloft (ineffective, titrated down \"too fast, really bad panic attack\")  - Adderall, hydroxyzine trials referred to in chart    Pertinent Background:  See previous notes.  Psych critical item history includes one week admit in [place] in 2019, history of active SI with plan to shoot himself in 2019,     Interim History         The patient is a fair historian and reports good treatment adherence.    Last seen on 5/09/2024 when he chose to continue fluoxetine 20mg daily, lorazepam 0.5mg daily PRN, trazodone 50mg at bed PRN.     He presents alone, received walking across campus to his next class. His Mom Maddy was not present.    Since the last visit, he's been good.   - taking meds daily, he has a reminder on his phone plus strong daily routine  - takes trazodone 25mg infrequently, and \"in instances where there's a lot of change going on\"  - no need for lorazepam, he's handling anxiety well  - he's back on campus, he's a senior majoring in economics   - notes good balance with college responsibilities and social life   - support from his Mom and therapist, he often prefers to keep things private from friends  - coping skills " "include sour candy, seeking support, face the anxiety and neutralize it, paced breathing, self affirm, self talk, practicing gratitude, drinking water, eating, journaling    Recent Symptoms:   Depression: PHQ 3, few days of interrupted sleep, low energy, varied appetite  Anxiety: less intense, less frequent anxiety symptoms including worry for his cardiac health, he monitors intermittent GERD and a muscle twitch over his heart  - while he dislikes change, he is aware he is \"very capable of coping and adapting to it\"     ADVERSE EFFECTS: none  MEDICAL CONCERNS: none today    APPETITE: OK, few episodes of GI upset, estimates weighing in the 170s  SLEEP: sleeping 6-9 hours depending on his schedule     Recent Substance Use:  Alcohol- going out less, he's busier, less \"appeal\" with alcohol, drinking less and socially   Caffeine- one cup coffee daily, dislikes soda and energy drinks   Cannabis- none        Social/ Family History            FINANCIAL SUPPORT-  college student at [employer]        CHILDREN- None       LIVING SITUATION- lives in an apartment near campus  LEGAL- None  EARLY HISTORY/ EDUCATION- born and raised in [place], born to  parents. Sister Dalia (b. ). Current [employer] student. He spends summers at home in [place].  SOCIAL/ SPIRITUAL SUPPORT- social support from Mom, therapist; identifies as not Jew       CULTURAL INFLUENCES/ IMPACT- none       TRAUMA HISTORY- Mom perceives snow boarding accident in  as medical trauma  FEELS SAFE AT HOME- Yes   FAMILY PSYCH HISTORY: diffuse depression on maternal side; Mom- treated for OCD with fluvoxamine effectively, MGM- depression, MGF-  by suicide (shooting)    Medical / Surgical History                                 Patient Active Problem List   Diagnosis    Depression, major, in remission (H)    Panic disorder without agoraphobia       No past surgical history on file.     Medical Review of Systems              A comprehensive review of systems was " performed and is negative other than noted in the HPI.    Snowboarding accident in 2022 (torn labrum); denies TBI, LOC. Denies seizures.     Allergy    Amoxicillin-pot clavulanate, Oxycodone, and Doxycycline  Current Medications        Current Outpatient Medications   Medication Sig Dispense Refill    FLUoxetine (PROZAC) 20 MG capsule Take one 20mg cap daily 90 capsule 0    LORazepam (ATIVAN) 0.5 MG tablet Take one half to one (0.5 - 1) tab daily as needed for anxiety 5 tablet 0    traZODone (DESYREL) 50 MG tablet Take 1 tablet (50 mg) by mouth nightly as needed for sleep 30 tablet 0     Vitals          There were no vitals taken for this visit.     Pulse Readings from Last 5 Encounters:   02/24/22 60   10/15/21 74     Wt Readings from Last 5 Encounters:   02/24/22 79.4 kg (175 lb)   10/15/21 80.3 kg (177 lb)     BP Readings from Last 5 Encounters:   02/24/22 133/59   10/15/21 98/50     Mental Status Exam          Alertness: alert  and oriented  Appearance: adequately groomed  Behavior/Demeanor: cooperative, pleasant and calm, with good  eye contact   Speech: normal and regular rate and rhythm  Language: no problems  Psychomotor: normal or unremarkable  Mood: improved, stable, less anxious  Affect: restricted and appropriate; was congruent to mood; was congruent to content  Thought Process/Associations: unremarkable  Thought Content:  Reports none;  Denies suicidal and violent ideation, delusions, preoccupations, obsessions , phobia , magical thinking, over-valued ideas and paranoid ideation  Perception:  Reports none;  Denies auditory hallucinations, visual hallucinations, visual distortion seen as shadows , depersonalization and derealization  Insight: fair  Judgment: adequate for safety  Cognition: appears grossly intact; formal cognitive testing was not done  Gait/Station and/or Muscle Strength/Tone:  N/A    Labs and Data                          Rating Scales:      Answers submitted by the patient for this  visit:  Patient Health Questionnaire (Submitted on 10/2/2024)  If you checked off any problems, how difficult have these problems made it for you to do your work, take care of things at home, or get along with other people?: Not difficult at all  PHQ9 TOTAL SCORE: 3    PHQ9 Today:        2/5/2024     3:29 PM 5/9/2024     3:21 PM 10/2/2024     3:53 PM   PHQ   PHQ-9 Total Score 0 2 3   Q9: Thoughts of better off dead/self-harm past 2 weeks Not at all Not at all Not at all     Diagnosis      Impression includes recurrent, moderate MDD in partial remission and GAVIN responding to meds and therapy  - per patient, previous diagnosis of ADHD is managed with behavioral skills as stimulant was activating  - per Mom, medical trauma from snowboarding accident in 2022     Assessment         TODAY, the following items were reviewed:     ([place] & [place]): 10/2023    PSYCHOTROPIC DRUG INTERACTIONS: none with Effexor, Ativan    Drug Interaction Management: Monitoring for adverse effects, routine vitals, using lowest therapeutic dose of [psychotropics] and patient is aware of risks    Plan                                                                                                                     1) he chooses to continue fluoxetine 20mg daily, lorazepam 0.5mg daily PRN (has), trazodone 50mg at bed PRN  - reviewed writer's conservative prescribing style, previously agreed to fill up to #60 lorazepam per year as needed or #5/ month, no RF unless needed)    2) stopped therapy, using skills    RTC: 6 months, sooner as needed    CRISIS NUMBERS:   Provided routinely in AVS.    Treatment Risk Statement:  The patient understands the risks, benefits, adverse effects and alternatives. Agrees to treatment with the capacity to do so. No medical contraindications to treatment. Agrees to call clinic for any problems. The patient understands to call 911 or go to the nearest ED if life threatening or urgent symptoms occur.     WHODAS 2.0  TODAY  total score = N/A; [a 12-item WHODAS 2.0 assessment was not completed by the pt today and/or recorded in EPIC].    PROVIDER:  JENNIFER Alvarez CNP

## 2024-10-02 NOTE — PATIENT INSTRUCTIONS
**For crisis resources, please see the information at the end of this document**   Patient Education    Thank you for coming to the [facility] [place] [facility].     Lab Testing:  If you had lab testing today and your results are reassuring or normal they will be mailed to you or sent through [facility] within 7 days. If the lab tests need quick action we will call you with the results. The phone number we will call with results is # [phone]. If this is not the best number please call our clinic and change the number.     Medication Refills:  If you need any refills please call your pharmacy and they will contact us. Our fax number for refills is [phone].   Three business days of notice are needed for general medication refill requests.   Five business days of notice are needed for controlled substance refill requests.   If you need to change to a different pharmacy, please contact the new pharmacy directly. The new pharmacy will help you get your medications transferred.     Contact Us:  Please call [phone] during business hours (8-5:00 M-F).   If you have medication related questions after clinic hours, or on the weekend, please call [phone].     Financial Assistance [phone]   Medical Records [phone]       MENTAL HEALTH CRISIS RESOURCES:  For a emergency help, please call 911 or go to the nearest Emergency Department.     Emergency Walk-In Options:   EmPATH Unit @ [facility] ([place]): [phone] - Specialized mental health emergency area designed to be calming  [facility] ([place]): [phone]  [facility] ([place]): [phone]  [facility]): [phone]    [facility] Crisis Information:   [place]: [phone]  [place]: [phone]  [place] ([place]) - Adult: [phone]     Child: [phone]  [place] - Adult: [phone]     Child: [phone]  [place]:  [phone]  List of all [facility] resources:   https://[place].gov/dhs/people-we-serve/adults/health-care/mental-health/resources/crisis-contacts.jsp    National Crisis Information:   Crisis Text Line: Text  [place]  to 393732  Suicide & Crisis Lifeline: 988  National Suicide Prevention Lifeline: 2-399-385-TALK (1-350.109.1661)       For online chat options, visit https://suicidepreventionlifeline.org/chat/  Poison Control Center: 3-715-738-7624  Trans Lifeline: 5-520-641-5742 - Hotline for transgender people of all ages  The [employer]: 8-586-216-3834 - Hotline for LGBT youth     For Non-Emergency Support:   Fast Tracker: Mental Health & Substance Use Disorder Resources -   https://www.[employer].org/

## 2024-10-05 ENCOUNTER — HEALTH MAINTENANCE LETTER (OUTPATIENT)
Age: 24
End: 2024-10-05

## 2025-04-02 ENCOUNTER — VIRTUAL VISIT (OUTPATIENT)
Dept: PSYCHIATRY | Facility: CLINIC | Age: 25
End: 2025-04-02
Attending: NURSE PRACTITIONER
Payer: COMMERCIAL

## 2025-04-02 DIAGNOSIS — F41.1 GAD (GENERALIZED ANXIETY DISORDER): ICD-10-CM

## 2025-04-02 DIAGNOSIS — F32.5 DEPRESSION, MAJOR, IN REMISSION: ICD-10-CM

## 2025-04-02 ASSESSMENT — PATIENT HEALTH QUESTIONNAIRE - PHQ9
SUM OF ALL RESPONSES TO PHQ QUESTIONS 1-9: 0
10. IF YOU CHECKED OFF ANY PROBLEMS, HOW DIFFICULT HAVE THESE PROBLEMS MADE IT FOR YOU TO DO YOUR WORK, TAKE CARE OF THINGS AT HOME, OR GET ALONG WITH OTHER PEOPLE: NOT DIFFICULT AT ALL
SUM OF ALL RESPONSES TO PHQ QUESTIONS 1-9: 0

## 2025-04-02 ASSESSMENT — PAIN SCALES - GENERAL: PAINLEVEL_OUTOF10: NO PAIN (0)

## 2025-04-02 NOTE — NURSING NOTE
Current patient location:  school    Is the patient currently in the state [facility]? YES    Visit mode: VIDEO    If the visit is dropped, the patient can be reconnected by:VIDEO VISIT: Text to cell phone:   Telephone Information:   Mobile [phone]       Will anyone else be joining the visit? NO  (If patient encounters technical issues they should call [phone] :[phone])    Are changes needed to the allergy or medication list? No    Are refills needed on medications prescribed by this physician? YES    Rooming Documentation:  Questionnaire(s) completed    Reason for visit: RECHECK    Samuel BEGUM

## 2025-04-02 NOTE — PROGRESS NOTES
"Virtual Visit Details    Type of service:  Video Visit     Originating Location (pt. Location): Home  Distant Location (provider location):  Off-site  Platform used for Video Visit: [facility]  Psychiatry Clinic    PSYCHIATRIC PROGRESS NOTE       Barrett Bazan is a 24 year old male who prefers the name Barrett and pronouns he, him.  Therapist: none  PCP: Eileen Andrew  Other Providers: followed by Dr Andrew in [place] as PCP    PREVIOUS PSYCH MED TRIALS:  - Lexapro 20mg (1st trial, \"it fixed all my problems with depression completely then the anxiety started)  - Vyvanse 20mg (activated anxiety)  - olanzapine zydis 5mg (given in 10/2021 ER note for marijuana abuse)  - trazodone 50mg  - Zoloft (ineffective, titrated down \"too fast, really bad panic attack\")  - Adderall, hydroxyzine trials referred to in chart    Pertinent Background:  See previous notes.  Psych critical item history includes one week admit in [place] in 2019, history of active SI with plan to shoot himself in 2019,     Interim History         The patient is a fair historian and reports good treatment adherence.    Last seen on 10/02/2024 when he chose to continue fluoxetine 20mg daily, lorazepam 0.5mg daily PRN (has), trazodone 50mg at bed PRN.    He presents alone. His Mom Maddy was not present.    Since the last visit, he's been good.   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    - notes good balance with responsibilities and social life   - support from his Mom and SO, he might prefer to keep things private from friends  - coping skills include " tasting sour candy, seeking support, face the anxiety and neutralizing it, paced breathing, self affirmation, practicing gratitude, drinking water, eating, journaling    Recent Symptoms:   Depression: PHQ 0  Anxiety: infrequent situational anxiety, he's able to access coping skills when needed, rarely notices getting triggered at the gym when his HR elevates, he might get numb and tingly     ADVERSE EFFECTS: none  MEDICAL CONCERNS: none today    APPETITE: OK, no GI upset, taking a probiotic PRN, 175# in Dec 2023  SLEEP: falling and staying asleep well, averaging 6-9 hours     Recent Substance Use:  Alcohol- going out less, he went out with friends 3-4x in the last two semesters   Caffeine- 12-24oz coffee daily, 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 Sister Dalia (b. ). Current [employer] student. He spends summers at home in [place].  SOCIAL/ SPIRITUAL SUPPORT- social support from Mom, therapist; identifies as not Orthodoxy       CULTURAL INFLUENCES/ IMPACT- none       TRAUMA HISTORY- Mom perceives snow boarding accident in  as medical trauma  FEELS SAFE AT HOME- Yes   FAMILY PSYCH HISTORY: diffuse depression on maternal side; Mom- treated for OCD with fluvoxamine effectively, MGM- depression, MGF-  by suicide (shooting)    Medical / Surgical History                                 Patient Active Problem List   Diagnosis    Depression, major, in remission    Panic disorder without agoraphobia       No past surgical history on file.     Medical Review of Systems              A comprehensive review of systems was performed and is negative other than noted in the HPI.    Snowboarding accident in  (torn labrum); denies  TBI, LOC. Denies seizures.     Allergy    Amoxicillin-pot clavulanate, Oxycodone, and Doxycycline  Current Medications        Current Outpatient Medications   Medication Sig Dispense Refill    FLUoxetine (PROZAC) 20 MG capsule Take one 20mg cap daily 90 capsule 1    LORazepam (ATIVAN) 0.5 MG tablet Take one half to one (0.5 - 1) tab daily as needed for anxiety 5 tablet 0    traZODone (DESYREL) 50 MG tablet Take 1 tablet (50 mg) by mouth nightly as needed for sleep. 30 tablet 0     Vitals          There were no vitals taken for this visit.     Pulse Readings from Last 5 Encounters:   02/24/22 60   10/15/21 74     Wt Readings from Last 5 Encounters:   02/24/22 79.4 kg (175 lb)   10/15/21 80.3 kg (177 lb)     BP Readings from Last 5 Encounters:   02/24/22 133/59   10/15/21 98/50     Mental Status Exam          Alertness: alert  and oriented  Appearance: adequately groomed  Behavior/Demeanor: cooperative, pleasant and calm, with good  eye contact   Speech: normal and regular rate and rhythm  Language: no problems  Psychomotor: normal or unremarkable  Mood: improved, stable, less anxious  Affect: restricted and appropriate; was congruent to mood; was congruent to content  Thought Process/Associations: unremarkable  Thought Content:  Reports none;  Denies suicidal and violent ideation, delusions, preoccupations, obsessions , phobia , magical thinking, over-valued ideas and paranoid ideation  Perception:  Reports none;  Denies auditory hallucinations, visual hallucinations, visual distortion seen as shadows , depersonalization and derealization  Insight: fair  Judgment: adequate for safety  Cognition: appears grossly intact; formal cognitive testing was not done  Gait/Station and/or Muscle Strength/Tone:  N/A    Labs and Data                          Rating Scales:      Answers submitted by the patient for this visit:  Patient Health Questionnaire (Submitted on 4/2/2025)  If you checked off any problems, how difficult have  these problems made it for you to do your work, take care of things at home, or get along with other people?: Not difficult at all  PHQ9 TOTAL SCORE: 0    PHQ9 Today:        5/9/2024     3:21 PM 10/2/2024     3:53 PM 4/2/2025     4:19 PM   PHQ   PHQ-9 Total Score 2 3 0    Q9: Thoughts of better off dead/self-harm past 2 weeks Not at all Not at all Not at all       Patient-reported     Diagnosis      Recurrent, moderate MDD in remission, GAVIN   - per patient, previous diagnosis of ADHD is managed with behavioral skills as stimulant was activating     Assessment         TODAY, the following items were reviewed:     ([place] & [place] 10/2023    PSYCHOTROPIC DRUG INTERACTIONS:     -TRAZODONE -- FLUOXETINE may result in increased risk of serotonin syndrome and increased risk of QT-interval prolongation.   - TRAZODONE -- LORAZEPAM may result in increased risk of CNS depression.     Drug Interaction Management: Monitoring for adverse effects, routine vitals, using lowest therapeutic dose of [psychotropics] and patient is aware of risks    Plan                                                                                                                     1) he chooses to continue fluoxetine 20mg daily (needs), lorazepam 0.5mg daily PRN (has), trazodone 50mg (0.5-1) at bed PRN (has)  - reviewed writer's conservative prescribing style, previously agreed to fill up to #60 lorazepam per year as needed or #5/ month, no RF unless needed)    2) stopped therapy, using skills    RTC: 6 months, sooner as needed    Level of Medical Decision Making:   - At least 2 stable chronic problems  - Engaged in prescription drug management during visit (discussed any medication benefits, side effects, alternatives, etc.)     The longitudinal plan of care for the diagnosis(es)/condition(s) as documented were addressed during this visit. Due to the added complexity in care, I will continue to support Barrett in the subsequent management and with  ongoing continuity of care.    CRISIS NUMBERS:   Provided routinely in AVS.    Treatment Risk Statement:  The patient understands the risks, benefits, adverse effects and alternatives. Agrees to treatment with the capacity to do so. No medical contraindications to treatment. Agrees to call clinic for any problems. The patient understands to call 911 or go to the nearest ED if life threatening or urgent symptoms occur.     WHODAS 2.0  TODAY total score = N/A; [a 12-item WHODAS 2.0 assessment was not completed by the pt today and/or recorded in EPIC].    PROVIDER:  JENNIFER Alvarez CNP

## (undated) DEVICE — MMIS - STAPLER INTERNAL 340MM 45MM LNR CUTTER ECHELON

## (undated) DEVICE — MMIS - SOLUTION AQLT 0.9% NACL 1000ML LF IRR

## (undated) DEVICE — MMIS - SLR/DIVR BP 37CM 5MM LGSR MARYLAND JAW LAPSCP STRL

## (undated) DEVICE — MMIS - COVER SRG CNTRL STRL LF LIGHT HNDL HARMONYAIR M

## (undated) DEVICE — Device

## (undated) DEVICE — MMIS - SWAB TROCAR MICROFIBER CLOTH 5MM & 10/12MM

## (undated) DEVICE — MMIS - TIP SCT STRYKEFLOW2 LF DISP

## (undated) DEVICE — MMIS - DISSECTOR ENDO 38CM 5MM KITTNER 1 TIP RADOPQ BRUSH

## (undated) DEVICE — MMIS - TROCAR LAPSCP 100MM 12MM KII BLN BLUNT TIP LF OPTC

## (undated) DEVICE — MMIS - TROCAR LAPSCP 100MM 5MM EPTH XCEL STAB SLV BLDLS

## (undated) DEVICE — MMIS - GLOVE SURG 6.5 PROTEXIS LF CRM PF BEAD CUFF STRL

## (undated) DEVICE — MMIS - STRAP PSTN 60X3IN DEVON BODY KN

## (undated) DEVICE — MMIS - SCISSORS LAPSCP RCHT HNDL MNPLR CAUT INSULATE

## (undated) DEVICE — MMIS - BAG SPEC RTRVL 6X4IN 10MM EPCH LG PLASTIC INTRO

## (undated) DEVICE — MMIS - ELECTRODE PT RTN C30- LB 9FT CORD NONIRRITATE

## (undated) DEVICE — MMIS - DRESSING TRANSPARENT 2.75X2IN 1X1.5IN 3M TEGADERM

## (undated) DEVICE — MMIS - SUTURE VCL+ 0 UR-6 27IN BRAID COAT ABS VIOL

## (undated) DEVICE — MMIS - SLR/DIVR LAPSCP 37CM 5MM LGSR MARYLAND 350D 1 STEP

## (undated) DEVICE — MMIS - GLOVE SURG 8 PROTEXIS LF CRM PF BEAD CUFF STRL

## (undated) DEVICE — MMIS - SUTURE MONOCRYL + MTPS 4-0 PS2 18IN MONO ABS

## (undated) DEVICE — MMIS - APPLICATOR 70% ISO ALC 2% CHG 26ML 13.2X13.2IN

## (undated) DEVICE — MMIS - SOLUTION 500ML PLASTIC POUR BTL 0.9% NACL IRR